# Patient Record
Sex: FEMALE | Race: BLACK OR AFRICAN AMERICAN | NOT HISPANIC OR LATINO | Employment: OTHER | URBAN - METROPOLITAN AREA
[De-identification: names, ages, dates, MRNs, and addresses within clinical notes are randomized per-mention and may not be internally consistent; named-entity substitution may affect disease eponyms.]

---

## 2017-01-11 ENCOUNTER — TELEPHONE (OUTPATIENT)
Dept: HEPATOLOGY | Facility: CLINIC | Age: 72
End: 2017-01-11

## 2017-01-11 DIAGNOSIS — R18.8 CIRRHOSIS OF LIVER WITH ASCITES, UNSPECIFIED HEPATIC CIRRHOSIS TYPE: Primary | ICD-10-CM

## 2017-01-11 DIAGNOSIS — K74.60 CIRRHOSIS OF LIVER WITH ASCITES, UNSPECIFIED HEPATIC CIRRHOSIS TYPE: Primary | ICD-10-CM

## 2017-01-11 DIAGNOSIS — B18.2 HEP C W/O COMA, CHRONIC: ICD-10-CM

## 2017-01-12 NOTE — TELEPHONE ENCOUNTER
I saw Ms. Clarisa Shi at [facility] with Dr. Elizabeth Lopez on 1/11/17, where she was referred for treatment of recently diagnosed hep C.  She has genotype 1a, viral load 551k, Patient clearly demonstrated abdominal ascites and leg edema, which came on over approx 6 weeks, Dr. Pineda started lasix 40 mg BID, spironolactone 100 mg BID, but patient states she has not responded.  She has marked muscle wasting in temporal muscles, arms, edema.in both legs.  Given her discomfort due to vary large abdomen, shortness of breath on exertion, I will see her next week at [facility] on Tuesday 1/17/17.  She will need basic labs, ultrasound with doppler, paracentesis, fluid analysis.  Later, she may need transplant.      Labs from [facility] dated 12/2/16:  Plt 142, INR 1.5, albumin 2.4, AFP 5.8    Orders placed for:  IR paracentesis, fluid protein, albumin, cell count, diff, bacterial culture.     Please schedule paracentesis for morning of Tuesday 1/17/17.    Please schedule lab appt for CBC, CMP, PT INR, AFP on 1/17/17 early AM so results will be available before IR paracentesis.    Please give appntment at 1:30 PM for me to see her in hepatology clinic.

## 2017-01-12 NOTE — TELEPHONE ENCOUNTER
MA called [facility] to get a good phone number for patient. Phone number updated.     MA called patient to inform her that she is scheduled to do Labs and Ultrasound tomorrow 1/13/17 12:00 labs and 1:15 Ultrasound. Remind patient that she need to be fasting 8 hour for the ultrasound patient understood.     Inform her also that she is scheduled to do PARACENTESIS on 1/17/17 at 10:00 am and 11:00 am to see Dr. Chun. Patient accepted the appt, mailed appt reminder to patient.Sophia

## 2017-01-16 DIAGNOSIS — R18.8 OTHER ASCITES: Primary | ICD-10-CM

## 2017-01-17 ENCOUNTER — OFFICE VISIT (OUTPATIENT)
Dept: HEPATOLOGY | Facility: CLINIC | Age: 72
End: 2017-01-17
Payer: MEDICARE

## 2017-01-17 ENCOUNTER — HOSPITAL ENCOUNTER (OUTPATIENT)
Dept: INTERVENTIONAL RADIOLOGY/VASCULAR | Facility: HOSPITAL | Age: 72
Discharge: HOME | End: 2017-01-17
Attending: INTERNAL MEDICINE
Payer: MEDICARE

## 2017-01-17 VITALS
RESPIRATION RATE: 20 BRPM | SYSTOLIC BLOOD PRESSURE: 129 MMHG | TEMPERATURE: 96 F | BODY MASS INDEX: 26.76 KG/M2 | HEART RATE: 89 BPM | DIASTOLIC BLOOD PRESSURE: 56 MMHG | WEIGHT: 156.75 LBS | OXYGEN SATURATION: 98 % | HEIGHT: 64 IN

## 2017-01-17 VITALS
RESPIRATION RATE: 20 BRPM | HEART RATE: 93 BPM | OXYGEN SATURATION: 100 % | DIASTOLIC BLOOD PRESSURE: 67 MMHG | SYSTOLIC BLOOD PRESSURE: 144 MMHG

## 2017-01-17 DIAGNOSIS — B18.2 CHRONIC HEPATITIS C WITHOUT MENTION OF HEPATIC COMA: ICD-10-CM

## 2017-01-17 DIAGNOSIS — R18.8 CIRRHOSIS OF LIVER WITH ASCITES, UNSPECIFIED HEPATIC CIRRHOSIS TYPE: Primary | ICD-10-CM

## 2017-01-17 DIAGNOSIS — R18.8 CIRRHOSIS OF LIVER WITH ASCITES, UNSPECIFIED HEPATIC CIRRHOSIS TYPE: ICD-10-CM

## 2017-01-17 DIAGNOSIS — K74.60 CIRRHOSIS OF LIVER WITH ASCITES, UNSPECIFIED HEPATIC CIRRHOSIS TYPE: ICD-10-CM

## 2017-01-17 DIAGNOSIS — K74.60 CIRRHOSIS OF LIVER WITH ASCITES, UNSPECIFIED HEPATIC CIRRHOSIS TYPE: Primary | ICD-10-CM

## 2017-01-17 LAB
ALBUMIN FLD-MCNC: 1 G/DL
APPEARANCE FLD: CLEAR
BODY FLD TYPE: NORMAL
COLOR FLD: YELLOW
LYMPHOCYTES NFR FLD MANUAL: 35 %
MESOTHL CELL NFR FLD MANUAL: 14 %
MONOS+MACROS NFR FLD MANUAL: 32 %
NEUTROPHILS NFR FLD MANUAL: 19 %
PROT FLD-MCNC: 2.6 G/DL
SPECIMEN SOURCE: NORMAL
SPECIMEN SOURCE: NORMAL
WBC # FLD: 38 /CU MM

## 2017-01-17 PROCEDURE — 87070 CULTURE OTHR SPECIMN AEROBIC: CPT

## 2017-01-17 PROCEDURE — C1729 CATH, DRAINAGE: HCPCS

## 2017-01-17 PROCEDURE — 82042 OTHER SOURCE ALBUMIN QUAN EA: CPT

## 2017-01-17 PROCEDURE — 49083 ABD PARACENTESIS W/IMAGING: CPT | Mod: GC,,, | Performed by: FAMILY MEDICINE

## 2017-01-17 PROCEDURE — 99205 OFFICE O/P NEW HI 60 MIN: CPT | Mod: S$PBB,,, | Performed by: INTERNAL MEDICINE

## 2017-01-17 PROCEDURE — 89051 BODY FLUID CELL COUNT: CPT

## 2017-01-17 PROCEDURE — 84157 ASSAY OF PROTEIN OTHER: CPT

## 2017-01-17 PROCEDURE — 99999 PR PBB SHADOW E&M-EST. PATIENT-LVL III: CPT | Mod: PBBFAC,,, | Performed by: INTERNAL MEDICINE

## 2017-01-17 PROCEDURE — 99213 OFFICE O/P EST LOW 20 MIN: CPT | Mod: PBBFAC | Performed by: INTERNAL MEDICINE

## 2017-01-17 PROCEDURE — 27100111 IR PARACENTESIS WITH IMAGING

## 2017-01-17 RX ORDER — ALBUMIN HUMAN 250 G/1000ML
25 SOLUTION INTRAVENOUS
Status: DISCONTINUED | OUTPATIENT
Start: 2017-01-17 | End: 2017-01-18 | Stop reason: HOSPADM

## 2017-01-17 RX ADMIN — ALBUMIN (HUMAN) 25 G: 25 SOLUTION INTRAVENOUS at 10:01

## 2017-01-17 NOTE — PROGRESS NOTES
Paracentesis complete. 5000 mLs peritoneal fluid drained. Pt tolerated well. Dressing to rlq clean, dry, and intact. Albumin 25% given 100 mLs. Specimens sent per lab order.  Pt given discharge instructions, verbalized understanding.  Family at bs.  Family ambulated to lobby with family at side, gait steady.

## 2017-01-17 NOTE — PATIENT INSTRUCTIONS
Recommendations:  -  Low salt in the diet, avoid canned, bottled and processed foods.  -  Continue current meds  -  CBC, CMP, PT INR  -  Ultrasound of abdomen and AFP every 6 months, starting today, next due in July 2017.  -  Avoid alcohol, smoking, sedatives and meds with codeine.  -  Avoid high intake of Tylenol (more than 4 extra-strength pills in one day)  -  Call us if any bleeding, fevers, confusion, disorientation occur  -  Endoscopy: per Gi service here - give appt at next visit. .   -  Transplant option discussed, will evaluate when MELD >15.  -  Return in 1 month.

## 2017-01-17 NOTE — MR AVS SNAPSHOT
[facility] - Hepatology  [address]  [facility] [address]  Phone: [phone]  Fax: [phone]                  Clarisa Shi   2017 11:00 AM   Office Visit    Description:  Female : 1945   Provider:  Rubia Chun MD   Department:  [facility] - Hepatology           Reason for Visit     Cirrhosis           Diagnoses this Visit        Comments    Cirrhosis of liver with ascites, unspecified hepatic cirrhosis type    -  Primary     Chronic hepatitis C without mention of hepatic coma                To Do List           Goals (5 Years of Data)     None      Follow-Up and Disposition     Return in about 1 month (around 2017).    Follow-up and Disposition History      [facility]     [facility] On Call Nurse Care Line -  Assistance  Registered nurses in the [facility] On Call Center provide clinical advisement, health education, appointment booking, and other advisory services.  Call for this free service at [phone].             Medications           Message regarding Medications     Verify the changes and/or additions to your medication regime listed below are the same as discussed with your clinician today.  If any of these changes or additions are incorrect, please notify your healthcare provider.             Verify that the below list of medications is an accurate representation of the medications you are currently taking.  If none reported, the list may be blank. If incorrect, please contact your healthcare provider. Carry this list with you in case of emergency.           Current Medications     ibuprofen (ADVIL,MOTRIN) 200 MG tablet Take 200 mg by mouth every 6 (six) hours as needed.    multivitamin (THERAGRAN) per tablet Take 1 tablet by mouth once daily.    tramadol (ULTRAM) 50 mg tablet Take 1 tablet (50 mg total) by mouth every 6 (six) hours as needed for Pain.           Clinical Reference Information           Vital Signs - Last Recorded  Most recent update:  "1/17/2017  1:45 PM by Cammie Marcial MA    BP Pulse Temp Resp Ht Wt    (!) 129/56 (BP Location: Left arm, Patient Position: Sitting, BP Method: Automatic) 89 96.3 °F (35.7 °C) (Oral) 20 5' 4" (1.626 m) 71.1 kg (156 lb 12 oz)    SpO2 BMI             98% 26.91 kg/m2         Blood Pressure          Most Recent Value    BP  (!)  129/56      Allergies as of 1/17/2017     No Known Allergies      Immunizations Administered on Date of Encounter - 1/17/2017     None      [facility] Sign-Up     Activating your [facility] account is as easy as 1-2-3!     1) Visit [email or web address], select Sign Up Now, enter this activation code and your date of birth, then select Next.  [email or web address]  Expires: 3/3/2017 11:25 AM      2) Create a username and password to use when you visit [facility] in the future and select a security question in case you lose your password and select Next.    3) Enter your e-mail address and click Sign Up!    Additional Information  If you have questions, please e-mail [email or web address] or call [phone] to talk to our [facility] staff. Remember, [facility] is NOT to be used for urgent needs. For medical emergencies, dial 911.         Instructions    Recommendations:  -  Low salt in the diet, avoid canned, bottled and processed foods.  -  Continue current meds  -  CBC, CMP, PT INR  -  Ultrasound of abdomen and AFP every 6 months, starting today, next due in July 2017.  -  Avoid alcohol, smoking, sedatives and meds with codeine.  -  Avoid high intake of Tylenol (more than 4 extra-strength pills in one day)  -  Call us if any bleeding, fevers, confusion, disorientation occur  -  Endoscopy: per Gi service here - give appt at next visit. .   -  Transplant option discussed, will evaluate when MELD >15.  -  Return in 1 month.        "

## 2017-01-17 NOTE — IP AVS SNAPSHOT
[address]  [place] [address]  Phone: [phone]           I have received a copy of my After Visit Summary and discharge instructions from [facility].    INSTRUCTIONS RECEIVED AND UNDERSTOOD BY:                     Patient/Patient Representative: ________________________________________________________________     Date/Time: ________________________________________________________________                     Instructions Given By: ________________________________________________________________     Date/Time: ________________________________________________________________

## 2017-01-17 NOTE — H&P
Radiology History & Physical      SUBJECTIVE:     Chief Complaint: ascites    History of Present Illness:  Clarisa Shi is a 71 y.o. female who presents for ultrasound guided paracentesis  No past medical history on file.  No past surgical history on file.    Home Meds:   Prior to Admission medications    Medication Sig Start Date End Date Taking? Authorizing Provider   ibuprofen (ADVIL,MOTRIN) 200 MG tablet Take 200 mg by mouth every 6 (six) hours as needed.    Historical Provider, MD   multivitamin (THERAGRAN) per tablet Take 1 tablet by mouth once daily.    Historical Provider, MD   tramadol (ULTRAM) 50 mg tablet Take 1 tablet (50 mg total) by mouth every 6 (six) hours as needed for Pain. 3/30/13   Damion Cruz MD     Anticoagulants/Antiplatelets: no anticoagulation    Allergies: Review of patient's allergies indicates:  No Known Allergies  Sedation History:  no adverse reactions    Review of Systems:   Hematological: no known coagulopathies  Respiratory: no shortness of breath  Cardiovascular: no chest pain  Gastrointestinal: no abdominal pain  Genito-Urinary: no dysuria  Musculoskeletal: negative  Neurological: no TIA or stroke symptoms         OBJECTIVE:     Vital Signs (Most Recent)  Pulse: 93 (01/17/17 1051)  Resp: 20 (01/17/17 1051)  BP: (!) 144/67 (01/17/17 1051)  SpO2: 100 % (01/17/17 1051)    Physical Exam:  ASA: 3  Mallampati: n/a    General: no acute distress  Mental Status: alert and oriented to person, place and time  HEENT: normocephalic, atraumatic  Chest: unlabored breathing  Heart: regular heart rate  Abdomen: distended  Extremity: moves all extremities    Laboratory  Lab Results   Component Value Date    INR 1.3 (H) 01/17/2017       Lab Results   Component Value Date    WBC 3.28 (L) 01/17/2017    HGB 10.7 (L) 01/17/2017    HCT 30.9 (L) 01/17/2017    MCV 95 01/17/2017     (L) 01/17/2017    No results found for: GLU, NA, K, CL, CO2, BUN, CREATININE, CALCIUM, MG, ALT, AST, ALBUMIN,  BILITOT, BILIDIR    ASSESSMENT/PLAN:     Sedation Plan: local  Patient will undergo ultrasound guided paracentesis.    TARA Gramajo, FNP  Interventional Radiology  [phone] spectralink

## 2017-01-17 NOTE — PROGRESS NOTES
[facility] Consultation Note    Reason for Consult:  The primary encounter diagnosis was Cirrhosis of liver with ascites, unspecified hepatic cirrhosis type. A diagnosis of Chronic hepatitis C without mention of hepatic coma was also pertinent to this visit.    PCP: Primary Doctor No   [address] [place] [address] [id number]    HPI:  This is a 71 y.o. female here for evaluation of: hep C, cirrhosis, ascites.    72 y/o AA female, with hep C and cirrhosis diagnosed within the last month when she presented to her PCP at [facility] [place].  She had a large ascites last week when I saw her in the Hepatitis C clinic. She was given lasix and spironolactone last week and told to come to [facility] for management of end-stage liver disease, as she may need liver transplant.        Her Hep C genotype is 1a, viral load 551,000 IU/mL, she is treatment naive, platelet count was 142, albumin 2.4, INR 1.5, AFP 5.8.       Patient had paracentesis today with drainage of about 4 bottles, (report not yet available).  U/S was ordered, did not get done, because she could not get a ride.  Her grandson will bring her here, she also plans on getting to our clinic, but he works in [employer], and he can usually bring her here when he comes to work.  She can also get a city bus.    Elevated liver enzymes: No  Abnormal imaging: No  Cirrhosis: Yes  Hepatitis C: Yes  Hepatitis B: No  Fatty liver: No  Encephalopathy: No  Post-hospital discharge: No  Symptoms: abd distention    Primary hepatic manifestations:  Fatigue:No  Edema:No  Ascites:Yes  Encephalopathy:No  Abdominal pain:No  GI bleeds: No  Pruritus:No  Weight Changes:Yes  Changes in Bowel habits: No  Muscle cramps:No    Risk factors for liver disease:  No jaundice  No transfusions  No IVDU  Did not snort cocaine or similar agents  Did not live with anyone with hepatitis B or C  Sexual partner not tested  No hepatotoxic medications  No exposure to industrial  "toxins  Alcohol: none      ROS:  Constitutional: No fevers, chills, weight changes, fatigue  ENT: No allergies, nosebleeds,   CV: No chest pain  Pulm: No cough, shortness of breath  Ophtho: No vision changes  GI/Liver: see HPI - abd distention  Derm: No rash, itching  Heme: No swollen glands, bruising  MSK: No joint pains, joint swelling  : No dysuria, hematuria, decrease in urine output  Endo: No hot or cold intolerance  Neuro: No confusion, disorientation, difficulty with sleep, memory, concentration, syncope, seizure  Psych: No anxiety, depression    Medical History:  has a past medical history of Ascites; Cirrhosis; and Hepatitis.    Surgical History:  has no past surgical history on file.    Family History: family history is not on file.. Parents , no liver cancer or cirrhosis.     Social History:  reports that she quit smoking about 5 weeks ago. She has never used smokeless tobacco. She reports that she drinks alcohol. She reports that she does not use illicit drugs.    Review of patient's allergies indicates:  No Known Allergies    Current Outpatient Prescriptions   Medication Sig    ibuprofen (ADVIL,MOTRIN) 200 MG tablet Take 200 mg by mouth every 6 (six) hours as needed.    multivitamin (THERAGRAN) per tablet Take 1 tablet by mouth once daily.    tramadol (ULTRAM) 50 mg tablet Take 1 tablet (50 mg total) by mouth every 6 (six) hours as needed for Pain.     No current facility-administered medications for this visit.      Facility-Administered Medications Ordered in Other Visits   Medication    albumin human 25% bottle 25 g       Objective Findings:    Vital Signs:  Visit Vitals    BP (!) 129/56 (BP Location: Left arm, Patient Position: Sitting, BP Method: Automatic)    Pulse 89    Temp 96.3 °F (35.7 °C) (Oral)    Resp 20    Ht 5' 4" (1.626 m)    Wt 71.1 kg (156 lb 12 oz)    SpO2 98%    BMI 26.91 kg/m2     Body mass index is 26.91 kg/(m^2).    Physical Exam:  General Appearance: Well " appearing in no acute distress  Head:   Normocephalic, without obvious abnormality  Eyes:    No scleral icterus, EOMI  ENT: Neck supple, Lips, mucosa, and tongue normal; teeth and gums normal  Lungs: CTA bilaterally in anterior and posterior fields, no wheezes, no crackles.  Heart:  Regular rate and rhythm, S1, S2 normal, no murmurs heard  Abdomen: Soft, non tender, non distended with positive bowel sounds in all four quadrants. No hepatosplenomegaly, ascites, or mass  Extremities: 2+ pulses, no clubbing, cyanosis or edema  Skin: No rash  Neurologic: CN II-XII intact, alert, oriented x 3. No asterixis      Labs:  Lab Results   Component Value Date    WBC 3.28 (L) 01/17/2017    HGB 10.7 (L) 01/17/2017    HCT 30.9 (L) 01/17/2017     (L) 01/17/2017    INR 1.3 (H) 01/17/2017     CMP  No results found for: NA, K, CL, CO2, GLU, BUN, CREATININE, CALCIUM, PROT, ALBUMIN, BILITOT, ALKPHOS, AST, ALT, ANIONGAP, ESTGFRAFRICA, EGFRNONAA  Imaging:       Endoscopy:      Assessment:  1. Cirrhosis of liver with ascites, unspecified hepatic cirrhosis type    2. Chronic hepatitis C without mention of hepatic coma    -  hep C, decompensated cirrhosis, with ascites. On lasix and spironolactone.   -  HCV genotype 1a, treatment naive, will need Harvoni. X 12 weeks  -  Ascites: had paracentesis today, so far wbc count in fluid is low 38.   -  MELD labs pending -   -  Will start transplant eval if MELD >15.     Recommendations:  -  Low salt in the diet, avoid canned, bottled and processed foods.  -  Continue current meds  -  CBC, CMP, PT INR  -  Ultrasound of abdomen and AFP every 6 months, starting today, next due in July 2017.  -  Avoid alcohol, smoking, sedatives and meds with codeine.  -  Avoid high intake of Tylenol (more than 4 extra-strength pills in one day)  -  Call us if any bleeding, fevers, confusion, disorientation occur  -  Endoscopy: per Gi service here - give appt at next visit. .   -  Transplant option discussed, will  evaluate when MELD >15.  -  Return in 1 month.       No Follow-up on file.      Order summary:  No orders of the defined types were placed in this encounter.      Thank you so much for allowing me to participate in the care of Clarisa Chun MD

## 2017-01-17 NOTE — IP AVS SNAPSHOT
[facility]  [address]  [facility] [address]  Phone: [phone]           Patient Discharge Instructions     Our goal is to set you up for success. This packet includes information on your condition, medications, and your home care. It will help you to care for yourself so you don't get sicker and need to go back to the hospital.     Please ask your nurse if you have any questions.        There are many details to remember when preparing to leave the hospital. Here is what you will need to do:    1. Take your medicine. If you are prescribed medications, review your Medication List in the following pages. You may have new medications to  at the pharmacy and others that you'll need to stop taking. Review the instructions for how and when to take your medications. Talk with your doctor or nurses if you are unsure of what to do.     2. Go to your follow-up appointments. Specific follow-up information is listed in the following pages. Your may be contacted by a transition nurse or clinical provider about future appointments. Be sure we have all of the phone numbers to reach you, if needed. Please contact your provider's office if you are unable to make an appointment.     3. Watch for warning signs. Your doctor or nurse will give you detailed warning signs to watch for and when to call for assistance. These instructions may also include educational information about your condition. If you experience any of warning signs to your health, call your doctor.               [facility]  Unless otherwise directed by your provider, please contact [facility], our nurse care line that is available for 24/7 assistance.     [phone] (toll-free)    Registered nurses in the [facility] On [facility] Center provide clinical advisement, health education, appointment booking, and other advisory services.                    ** Verify the list of medication(s) below is accurate and up  to date. Carry this with you in case of emergency. If your medications have changed, please notify your healthcare provider.             Medication List      TAKE these medications        Additional Info                      ibuprofen 200 MG tablet   Commonly known as:  ADVIL,MOTRIN   Refills:  0   Dose:  200 mg    Instructions:  Take 200 mg by mouth every 6 (six) hours as needed.     Begin Date    AM    Noon    PM    Bedtime       multivitamin per tablet   Commonly known as:  THERAGRAN   Refills:  0   Dose:  1 tablet    Instructions:  Take 1 tablet by mouth once daily.     Begin Date    AM    Noon    PM    Bedtime       tramadol 50 mg tablet   Commonly known as:  ULTRAM   Quantity:  10 tablet   Refills:  0   Dose:  50 mg    Instructions:  Take 1 tablet (50 mg total) by mouth every 6 (six) hours as needed for Pain.     Begin Date    AM    Noon    PM    Bedtime                  Please bring to all follow up appointments:    1. A copy of your discharge instructions.  2. All medicines you are currently taking in their original bottles.  3. Identification and insurance card.    Please arrive 15 minutes ahead of scheduled appointment time.    Please call 24 hours in advance if you must reschedule your appointment and/or time.            Discharge Instructions       For scheduling: Call Gayatri at [phone]    For questions or concerns call: [facility] MON-FRI 8 AM- 5PM [phone]. Radiology resident on call [phone].    For immediate concerns that are not emergent, you may call our radiology clinic at: [phone]    Discharge References/Attachments     PARACENTESIS, DISCHARGE INSTRUCTIONS FOR (ENGLISH)        Admission Information     Date & Time Provider Department CSN    1/17/2017 10:00 AM Rubia Chun MD [facility] [id number]      Care Providers     Provider Role Specialty Primary office phone    Rubia Chun MD Attending Provider Gastroenterology [phone]      Your Vitals Were     BP  Pulse Resp SpO2          144/67 (BP Location: Left arm, Patient Position: Sitting, BP Method: Automatic) 93 20 100%        Recent Lab Values     No lab values to display.      Pending Labs     Order Current Status    Albumin, Peritoneal, Pleural Fluid or LISA Drainage, In-House Ascites In process    Culture, Body Fluid - Bactec In process    Protein, Peritoneal, Pleural Fluid or LISA Drainage, In-House Ascites In process    WBC & Diff,Body Fluid Ascites In process      Allergies as of 1/17/2017     No Known Allergies      Advance Directives     An advance directive is a document which, in the event you are no longer able to make decisions for yourself, tells your healthcare team what kind of treatment you do or do not want to receive, or who you would like to make those decisions for you.  If you do not currently have an advance directive, [facility] encourages you to create one.  For more information call:  [phone]), [phone] ([phone]),  or log on to [email or web address].        Smoking Cessation     If you would like to quit smoking:   You may be eligible for free services if you are a [place] resident and started smoking cigarettes before September 1, 1988.  Call the Smoking Cessation Trust ([facility]) toll free at (341) 024-8923 or (915) 616-2293.   Call 1-842-QUIT-NOW if you do not meet the above criteria.            Language Assistance Services     ATTENTION: Language assistance services are available, free of charge. Please call [phone].      ATENCIÓN: Si habla español, tiene a ahumada disposición servicios gratuitos de asistencia lingüística. Llame al [phone].     [facility] Ý: N?u b?n nói Ti?ng Vi?t, có các d?ch v? h? tr? ngôn ng? mi?n phí dành cho b?n. G?i s? [phone].        [facility] Sign-Up     Activating your [facility] account is as easy as 1-2-3!     1) Visit [email or web address], select Sign Up Now, enter this activation code and your date of birth, then select  Next.  [email or web address]  Expires: 3/3/2017 11:25 AM      2) Create a username and password to use when you visit [facility] in the future and select a security question in case you lose your password and select Next.    3) Enter your e-mail address and click Sign Up!    Additional Information  If you have questions, please e-mail [employer] [facility] [employer] [email or web address] or call [phone] to talk to our [facility] staff. Remember, [facility] is NOT to be used for urgent needs. For medical emergencies, dial 911.          [facility] complies with applicable Federal civil rights laws and does not discriminate on the basis of race, color, national origin, age, disability, or sex.

## 2017-01-17 NOTE — PROCEDURES
Radiology Post-Procedure Note    Pre Op Diagnosis: Ascites  Post Op Diagnosis: Same    Procedure: Ultrasound Guided Paracentesis    Procedure performed by: Dutch HERRERA, Ruth     Written Informed Consent Obtained: Yes  Specimen Removed: YES clear yellow fluid  Estimated Blood Loss: Minimal    Findings:   Successful paracentesis.  Albumin administered PRN per protocol.    Patient tolerated procedure well.    Ruth Judd, APRN, FNP  Interventional Radiology  [phone] spectralink

## 2017-01-22 LAB — BACTERIA FLD CULT: NORMAL

## 2017-02-10 ENCOUNTER — TELEPHONE (OUTPATIENT)
Dept: HEPATOLOGY | Facility: CLINIC | Age: 72
End: 2017-02-10

## 2017-02-10 NOTE — TELEPHONE ENCOUNTER
Received call from Dr Chun to set the patient up for a Paracentesis on Mon or Tues 2/13 or 2/14/17, then every 2 weeks thereafter.    Call placed to IR and spoke to Gayatri. Monday 2/13/17 at 8 am is available and then the patient can come back on Mon 2/27/17 at 8 am.  Patient called at home and message relayed to the patient from Dr Chun.   Patient offered the appointments on 2/13 and 2 week later 2/27/17 both at 8 am. Both were acceptable for the patient. Voiced understanding and agreed. DB

## 2017-02-13 ENCOUNTER — HOSPITAL ENCOUNTER (OUTPATIENT)
Dept: INTERVENTIONAL RADIOLOGY/VASCULAR | Facility: HOSPITAL | Age: 72
Discharge: HOME | End: 2017-02-13
Attending: INTERNAL MEDICINE
Payer: MEDICARE

## 2017-02-13 VITALS
OXYGEN SATURATION: 100 % | DIASTOLIC BLOOD PRESSURE: 50 MMHG | SYSTOLIC BLOOD PRESSURE: 114 MMHG | RESPIRATION RATE: 18 BRPM | HEART RATE: 99 BPM

## 2017-02-13 DIAGNOSIS — R18.8 CIRRHOSIS OF LIVER WITH ASCITES, UNSPECIFIED HEPATIC CIRRHOSIS TYPE: ICD-10-CM

## 2017-02-13 DIAGNOSIS — K74.60 CIRRHOSIS OF LIVER WITH ASCITES, UNSPECIFIED HEPATIC CIRRHOSIS TYPE: ICD-10-CM

## 2017-02-13 LAB
ALBUMIN FLD-MCNC: 0.9 G/DL
APPEARANCE FLD: CLEAR
BODY FLD TYPE: NORMAL
COLOR FLD: YELLOW
LYMPHOCYTES NFR FLD MANUAL: 53 %
MONOS+MACROS NFR FLD MANUAL: 40 %
NEUTROPHILS NFR FLD MANUAL: 7 %
PROT FLD-MCNC: 2.3 G/DL
SPECIMEN SOURCE: NORMAL
SPECIMEN SOURCE: NORMAL
WBC # FLD: 41 /CU MM

## 2017-02-13 PROCEDURE — 84157 ASSAY OF PROTEIN OTHER: CPT

## 2017-02-13 PROCEDURE — 49083 ABD PARACENTESIS W/IMAGING: CPT | Mod: ,,, | Performed by: FAMILY MEDICINE

## 2017-02-13 PROCEDURE — 63600175 PHARM REV CODE 636 W HCPCS: Performed by: INTERNAL MEDICINE

## 2017-02-13 PROCEDURE — 87070 CULTURE OTHR SPECIMN AEROBIC: CPT

## 2017-02-13 PROCEDURE — P9047 ALBUMIN (HUMAN), 25%, 50ML: HCPCS | Performed by: INTERNAL MEDICINE

## 2017-02-13 PROCEDURE — 89051 BODY FLUID CELL COUNT: CPT

## 2017-02-13 PROCEDURE — C1729 CATH, DRAINAGE: HCPCS

## 2017-02-13 PROCEDURE — 82042 OTHER SOURCE ALBUMIN QUAN EA: CPT

## 2017-02-13 RX ORDER — ALBUMIN HUMAN 250 G/1000ML
25 SOLUTION INTRAVENOUS CONTINUOUS PRN
Status: DISCONTINUED | OUTPATIENT
Start: 2017-02-13 | End: 2017-02-14 | Stop reason: HOSPADM

## 2017-02-13 RX ADMIN — ALBUMIN (HUMAN) 25 G: 25 SOLUTION INTRAVENOUS at 09:02

## 2017-02-13 NOTE — PROGRESS NOTES
Patient given discharge instructions and verbalized understanding. [facility]

## 2017-02-13 NOTE — DISCHARGE INSTRUCTIONS
"For scheduling: Call Gayatri at [phone]    For questions or concerns call: [facility] MON-FRI 8 AM- 5PM: [phone].   **After hours and weekends: Call [phone] and ask for "Radiology Resident on call".    For immediate concerns that are not emergent, you may call our radiology clinic at: [phone]    "

## 2017-02-13 NOTE — H&P
Radiology History & Physical      SUBJECTIVE:     Chief Complaint: ascites    History of Present Illness:  Clarisa Shi is a 71 y.o. female who presents for ultrasound guided paracentesis  Past Medical History   Diagnosis Date    Ascites     Cirrhosis     Hepatitis      Hep C deja 1a, treatment naive     No past surgical history on file.    Home Meds:   Prior to Admission medications    Medication Sig Start Date End Date Taking? Authorizing Provider   ibuprofen (ADVIL,MOTRIN) 200 MG tablet Take 200 mg by mouth every 6 (six) hours as needed.    Historical Provider, MD   multivitamin (THERAGRAN) per tablet Take 1 tablet by mouth once daily.    Historical Provider, MD   tramadol (ULTRAM) 50 mg tablet Take 1 tablet (50 mg total) by mouth every 6 (six) hours as needed for Pain. 3/30/13   Damion Cruz MD     Anticoagulants/Antiplatelets: no anticoagulation    Allergies: Review of patient's allergies indicates:  No Known Allergies  Sedation History:  no adverse reactions    Review of Systems:   Hematological: no known coagulopathies  Respiratory: no shortness of breath  Cardiovascular: no chest pain  Gastrointestinal: no abdominal pain  Genito-Urinary: no dysuria  Musculoskeletal: negative  Neurological: no TIA or stroke symptoms         OBJECTIVE:     Vital Signs (Most Recent)  Pulse: 91 (02/13/17 0820)  Resp: 18 (02/13/17 0820)  BP: 124/74 (02/13/17 0820)  SpO2: 100 % (02/13/17 0820)    Physical Exam:  ASA: 3  Mallampati: n/a    General: no acute distress  Mental Status: alert and oriented to person, place and time  HEENT: normocephalic, atraumatic  Chest: unlabored breathing  Heart: regular heart rate  Abdomen: distended  Extremity: moves all extremities    Laboratory  Lab Results   Component Value Date    INR 1.3 (H) 01/17/2017       Lab Results   Component Value Date    WBC 3.28 (L) 01/17/2017    HGB 10.7 (L) 01/17/2017    HCT 30.9 (L) 01/17/2017    MCV 95 01/17/2017     (L) 01/17/2017    No  results found for: GLU, NA, K, CL, CO2, BUN, CREATININE, CALCIUM, MG, ALT, AST, ALBUMIN, BILITOT, BILIDIR    ASSESSMENT/PLAN:     Sedation Plan: local  Patient will undergo ultrasound guided paracentesis.    TARA Gramajo, FNP  Interventional Radiology  [phone] spectralink

## 2017-02-13 NOTE — PROCEDURES
Radiology Post-Procedure Note    Pre Op Diagnosis: Ascites  Post Op Diagnosis: Same    Procedure: Ultrasound Guided Paracentesis    Procedure performed by: Dutch HERRERA, Ruth     Written Informed Consent Obtained: Yes  Specimen Removed: YES clear yellow fluid  Estimated Blood Loss: Minimal    Findings:   Successful paracentesis.  Albumin administered PRN per protocol.    Patient tolerated procedure well.    Ruth Judd, APRN, FNP  Interventional Radiology  [phone] spectralink

## 2017-02-13 NOTE — IP AVS SNAPSHOT
[facility]  [address]  [facility] [address]  Phone: [phone]           Patient Discharge Instructions     Our goal is to set you up for success. This packet includes information on your condition, medications, and your home care. It will help you to care for yourself so you don't get sicker and need to go back to the hospital.     Please ask your nurse if you have any questions.        There are many details to remember when preparing to leave the hospital. Here is what you will need to do:    1. Take your medicine. If you are prescribed medications, review your Medication List in the following pages. You may have new medications to  at the pharmacy and others that you'll need to stop taking. Review the instructions for how and when to take your medications. Talk with your doctor or nurses if you are unsure of what to do.     2. Go to your follow-up appointments. Specific follow-up information is listed in the following pages. Your may be contacted by a transition nurse or clinical provider about future appointments. Be sure we have all of the phone numbers to reach you, if needed. Please contact your provider's office if you are unable to make an appointment.     3. Watch for warning signs. Your doctor or nurse will give you detailed warning signs to watch for and when to call for assistance. These instructions may also include educational information about your condition. If you experience any of warning signs to your health, call your doctor.               [facility]  Unless otherwise directed by your provider, please contact [facility], our nurse care line that is available for 24/7 assistance.     [phone] (toll-free)    Registered nurses in the [facility] provide clinical advisement, health education, appointment booking, and other advisory services.                    ** Verify the list of medication(s) below is accurate and up  "to date. Carry this with you in case of emergency. If your medications have changed, please notify your healthcare provider.             Medication List      TAKE these medications        Additional Info                      ibuprofen 200 MG tablet   Commonly known as:  ADVIL,MOTRIN   Refills:  0   Dose:  200 mg    Instructions:  Take 200 mg by mouth every 6 (six) hours as needed.     Begin Date    AM    Noon    PM    Bedtime       multivitamin per tablet   Commonly known as:  THERAGRAN   Refills:  0   Dose:  1 tablet    Instructions:  Take 1 tablet by mouth once daily.     Begin Date    AM    Noon    PM    Bedtime       tramadol 50 mg tablet   Commonly known as:  ULTRAM   Quantity:  10 tablet   Refills:  0   Dose:  50 mg    Instructions:  Take 1 tablet (50 mg total) by mouth every 6 (six) hours as needed for Pain.     Begin Date    AM    Noon    PM    Bedtime                  Please bring to all follow up appointments:    1. A copy of your discharge instructions.  2. All medicines you are currently taking in their original bottles.  3. Identification and insurance card.    Please arrive 15 minutes ahead of scheduled appointment time.    Please call 24 hours in advance if you must reschedule your appointment and/or time.        Your Scheduled Appointments     Feb 20, 2017 10:40 AM CST   Established Patient Visit with Jyotsna Hayes NP   [facility] - Hepatology ([facility] )    [address]  [place] [id number] [phone]            Feb 27, 2017  8:00 AM CST   IR PARACENTHESIS with [facility] [id number]   [facility] ([facility])    [address] [id number] [phone]                  Discharge Instructions       For scheduling: Call Gayatri at [phone]    For questions or concerns call: ROCU MON-FRI 8 AM- 5PM: [phone].   **After hours and weekends: Call [phone] and ask for "Radiology Resident on call".    For immediate concerns that " are not emergent, you may call our radiology clinic at: [phone]      Discharge References/Attachments     PARACENTESIS, DISCHARGE INSTRUCTIONS FOR (ENGLISH)        Admission Information     Date & Time Provider Department CSN    2/13/2017  8:00 AM Rubia Chun MD [facility] [id number]      Care Providers     Provider Role Specialty Primary office phone    Rubia Chun MD Attending Provider Gastroenterology [phone]      Your Vitals Were     BP Pulse Resp SpO2          124/74 (Patient Position: Sitting, BP Method: Automatic) 91 18 100%        Recent Lab Values     No lab values to display.      Pending Labs     Order Current Status    Albumin, Peritoneal, Pleural Fluid or LISA Drainage, In-House Ascites In process    Culture, Body Fluid - Bactec In process    Protein, Peritoneal, Pleural Fluid or LISA Drainage, In-House Ascites In process    WBC & Diff,Body Fluid Ascites In process      Allergies as of 2/13/2017     No Known Allergies      Advance Directives     An advance directive is a document which, in the event you are no longer able to make decisions for yourself, tells your healthcare team what kind of treatment you do or do not want to receive, or who you would like to make those decisions for you.  If you do not currently have an advance directive, [facility] encourages you to create one.  For more information call:  [phone]), [phone] ([phone]),  or log on to [email or web address].        Smoking Cessation     If you would like to quit smoking:   You may be eligible for free services if you are a [place] resident and started smoking cigarettes before September 1, 1988.  Call the Smoking Cessation Trust (SCT) toll free at (036) 732-3511 or (470) 736-4342.   Call 2-960-QUIT-NOW if you do not meet the above criteria.            Language Assistance Services     ATTENTION: Language assistance services are available, free of charge. Please call  [phone].      ATENCIÓN: Si habla español, tiene a ahumada disposición servicios gratuitos de asistencia lingüística. Llame al [phone].     CHÚ Ý: N?u b?n nói Ti?ng Vi?t, có các d?ch v? h? tr? ngôn ng? mi?n phí dành cho b?n. G?i s? [phone].        [facility] Sign-Up     Activating your [facility] account is as easy as 1-2-3!     1) Visit [email or web address], select Sign Up Now, enter this activation code and your date of birth, then select Next.  [email or web address]  Expires: 3/3/2017 11:25 AM      2) Create a username and password to use when you visit [facility] in the future and select a security question in case you lose your password and select Next.    3) Enter your e-mail address and click Sign Up!    Additional Information  If you have questions, please e-mail [email or web address] or call [phone] to talk to our [facility] staff. Remember, [facility] is NOT to be used for urgent needs. For medical emergencies, dial 911.          [facility] complies with applicable Federal civil rights laws and does not discriminate on the basis of race, color, national origin, age, disability, or sex.

## 2017-02-18 LAB — BACTERIA FLD CULT: NORMAL

## 2017-02-20 ENCOUNTER — OFFICE VISIT (OUTPATIENT)
Dept: HEPATOLOGY | Facility: CLINIC | Age: 72
End: 2017-02-20
Payer: MEDICARE

## 2017-02-20 ENCOUNTER — LAB VISIT (OUTPATIENT)
Dept: LAB | Facility: HOSPITAL | Age: 72
End: 2017-02-20
Attending: INTERNAL MEDICINE
Payer: MEDICARE

## 2017-02-20 VITALS
RESPIRATION RATE: 18 BRPM | SYSTOLIC BLOOD PRESSURE: 121 MMHG | HEART RATE: 82 BPM | WEIGHT: 157.88 LBS | OXYGEN SATURATION: 100 % | TEMPERATURE: 97 F | HEIGHT: 64 IN | DIASTOLIC BLOOD PRESSURE: 58 MMHG | BODY MASS INDEX: 26.95 KG/M2

## 2017-02-20 DIAGNOSIS — K74.60 CIRRHOSIS OF LIVER WITH ASCITES, UNSPECIFIED HEPATIC CIRRHOSIS TYPE: ICD-10-CM

## 2017-02-20 DIAGNOSIS — K74.60 CIRRHOSIS OF LIVER WITH ASCITES, UNSPECIFIED HEPATIC CIRRHOSIS TYPE: Primary | ICD-10-CM

## 2017-02-20 DIAGNOSIS — R18.8 CIRRHOSIS OF LIVER WITH ASCITES, UNSPECIFIED HEPATIC CIRRHOSIS TYPE: Primary | ICD-10-CM

## 2017-02-20 DIAGNOSIS — R18.8 CIRRHOSIS OF LIVER WITH ASCITES, UNSPECIFIED HEPATIC CIRRHOSIS TYPE: ICD-10-CM

## 2017-02-20 LAB
AFP SERPL-MCNC: 5.3 NG/ML
ALBUMIN SERPL BCP-MCNC: 2.4 G/DL
ALP SERPL-CCNC: 138 U/L
ALT SERPL W/O P-5'-P-CCNC: 20 U/L
ANION GAP SERPL CALC-SCNC: 6 MMOL/L
AST SERPL-CCNC: 45 U/L
BASOPHILS # BLD AUTO: 0.01 K/UL
BASOPHILS NFR BLD: 0.3 %
BILIRUB SERPL-MCNC: 1.7 MG/DL
BUN SERPL-MCNC: 21 MG/DL
CALCIUM SERPL-MCNC: 8.4 MG/DL
CHLORIDE SERPL-SCNC: 104 MMOL/L
CO2 SERPL-SCNC: 27 MMOL/L
CREAT SERPL-MCNC: 1.3 MG/DL
DIFFERENTIAL METHOD: ABNORMAL
EOSINOPHIL # BLD AUTO: 0.1 K/UL
EOSINOPHIL NFR BLD: 2.2 %
ERYTHROCYTE [DISTWIDTH] IN BLOOD BY AUTOMATED COUNT: 14.2 %
EST. GFR  (AFRICAN AMERICAN): 48.4 ML/MIN/1.73 M^2
EST. GFR  (NON AFRICAN AMERICAN): 42 ML/MIN/1.73 M^2
GLUCOSE SERPL-MCNC: 97 MG/DL
HCT VFR BLD AUTO: 35.6 %
HGB BLD-MCNC: 11.6 G/DL
INR PPP: 1.3
LYMPHOCYTES # BLD AUTO: 1 K/UL
LYMPHOCYTES NFR BLD: 31.3 %
MCH RBC QN AUTO: 31.4 PG
MCHC RBC AUTO-ENTMCNC: 32.6 %
MCV RBC AUTO: 96 FL
MONOCYTES # BLD AUTO: 0.4 K/UL
MONOCYTES NFR BLD: 11.3 %
NEUTROPHILS # BLD AUTO: 1.7 K/UL
NEUTROPHILS NFR BLD: 54.6 %
PLATELET # BLD AUTO: 157 K/UL
PMV BLD AUTO: 10 FL
POTASSIUM SERPL-SCNC: 3.8 MMOL/L
PROT SERPL-MCNC: 6 G/DL
PROTHROMBIN TIME: 13.6 SEC
RBC # BLD AUTO: 3.7 M/UL
SODIUM SERPL-SCNC: 137 MMOL/L
WBC # BLD AUTO: 3.19 K/UL

## 2017-02-20 PROCEDURE — 82105 ALPHA-FETOPROTEIN SERUM: CPT

## 2017-02-20 PROCEDURE — 85610 PROTHROMBIN TIME: CPT

## 2017-02-20 PROCEDURE — 36415 COLL VENOUS BLD VENIPUNCTURE: CPT

## 2017-02-20 PROCEDURE — 85025 COMPLETE CBC W/AUTO DIFF WBC: CPT

## 2017-02-20 PROCEDURE — 99999 PR PBB SHADOW E&M-EST. PATIENT-LVL III: CPT | Mod: PBBFAC,,, | Performed by: INTERNAL MEDICINE

## 2017-02-20 PROCEDURE — 80053 COMPREHEN METABOLIC PANEL: CPT

## 2017-02-20 PROCEDURE — 99215 OFFICE O/P EST HI 40 MIN: CPT | Mod: S$PBB,,, | Performed by: INTERNAL MEDICINE

## 2017-02-20 RX ORDER — SPIRONOLACTONE 100 MG/1
100 TABLET, FILM COATED ORAL 2 TIMES DAILY
COMMUNITY

## 2017-02-20 RX ORDER — FUROSEMIDE 80 MG/1
80 TABLET ORAL 2 TIMES DAILY
COMMUNITY
Start: 2017-01-29

## 2017-02-20 NOTE — PATIENT INSTRUCTIONS
Plan:  -  Low salt in the diet, avoid canned, bottled and processed foods.  -  Continue current meds  -  CBC, CMP, PT INR q 2 months, start today  -  Ultrasound of abdomen and AFP every 6 months, starting today, next due in July 2017.  -  Avoid alcohol, smoking, sedatives and meds with codeine.  -  Avoid high intake of Tylenol (more than 4 extra-strength pills in one day)  -  Call us if any bleeding, fevers, confusion, disorientation occur  -  Endoscopy: per Gi service here - give appt.   -  Transplant option discussed, will evaluate when MELD >15.  -  Return in 2 months.

## 2017-02-20 NOTE — MR AVS SNAPSHOT
[facility] - Hepatology  [address]  [facility] [address]  Phone: [phone]  Fax: [phone]                  Clarisa Shi   2017 10:40 AM   Office Visit    Description:  Female : 1945   Provider:  Rubia Chun MD   Department:  [facility]           Reason for Visit     Follow-up     Cirrhosis     Hepatitis C           Diagnoses this Visit        Comments    Cirrhosis of liver with ascites, unspecified hepatic cirrhosis type    -  Primary            To Do List           Future Appointments        Provider Department Dept Phone    2017 8:00 AM [facility] [id number] [facility] [phone]      Goals (5 Years of Data)     None      Follow-Up and Disposition     Return in about 2 months (around 2017).    Follow-up and Disposition History      [facility]     [facility] Nurse Care Line -  Assistance  Registered nurses in the [facility] provide clinical advisement, health education, appointment booking, and other advisory services.  Call for this free service at [phone].             Medications           Message regarding Medications     Verify the changes and/or additions to your medication regime listed below are the same as discussed with your clinician today.  If any of these changes or additions are incorrect, please notify your healthcare provider.             Verify that the below list of medications is an accurate representation of the medications you are currently taking.  If none reported, the list may be blank. If incorrect, please contact your healthcare provider. Carry this list with you in case of emergency.           Current Medications     furosemide (LASIX) 80 MG tablet Take 80 mg by mouth 2 (two) times daily.    multivitamin (THERAGRAN) per tablet Take 1 tablet by mouth once daily.    spironolactone (ALDACTONE) 100 MG tablet Take 100 mg by mouth 2 (two) times daily.    ibuprofen (ADVIL,MOTRIN) 200 MG  "tablet Take 200 mg by mouth every 6 (six) hours as needed.    tramadol (ULTRAM) 50 mg tablet Take 1 tablet (50 mg total) by mouth every 6 (six) hours as needed for Pain.           Clinical Reference Information           Your Vitals Were     BP Pulse Temp Resp Height Weight    121/58 (BP Location: Left arm, Patient Position: Sitting) 82 96.9 °F (36.1 °C) (Oral) 18 5' 4" (1.626 m) 71.6 kg (157 lb 13.6 oz)    SpO2 BMI             100% 27.09 kg/m2         Blood Pressure          Most Recent Value    BP  (!)  121/58      Allergies as of 2/20/2017     No Known Allergies      Immunizations Administered on Date of Encounter - 2/20/2017     None      Orders Placed During Today's Visit      Normal Orders This Visit    Case request GI: ESOPHAGOGASTRODUODENOSCOPY (EGD)     Recurring Lab Work Interval Expires    AFP tumor marker  every 6 months until 2/20/2018 2/20/2018    CBC auto differential  every 2 months until 2/20/2019 2/20/2019    Comprehensive metabolic panel  every 2 months until 2/20/2019 2/20/2019    Protime-INR  every 2 months until 2/20/2019 2/20/2019    US Abdomen Limited  every 6 months until 2/20/2019 2/20/2019      [facility] Sign-Up     Activating your [facility] account is as easy as 1-2-3!     1) Visit [email or web address], select Sign Up Now, enter this activation code and your date of birth, then select Next.  [email or web address]  Expires: 3/3/2017 11:25 AM      2) Create a username and password to use when you visit [facility] in the future and select a security question in case you lose your password and select Next.    3) Enter your e-mail address and click Sign Up!    Additional Information  If you have questions, please e-mail [email or web address] or call [phone] to talk to our [facility] staff. Remember, [facility] is NOT to be used for urgent needs. For medical emergencies, dial 911.         Instructions    Plan:  -  Low salt in the diet, avoid canned, bottled and processed foods.  -  Continue current " meds  -  CBC, CMP, PT INR q 2 months, start today  -  Ultrasound of abdomen and AFP every 6 months, starting today, next due in July 2017.  -  Avoid alcohol, smoking, sedatives and meds with codeine.  -  Avoid high intake of Tylenol (more than 4 extra-strength pills in one day)  -  Call us if any bleeding, fevers, confusion, disorientation occur  -  Endoscopy: per Gi service here - give appt.   -  Transplant option discussed, will evaluate when MELD >15.  -  Return in 2 months.          Language Assistance Services     ATTENTION: Language assistance services are available, free of charge. Please call [phone].      ATENCIÓN: Si habla español, tiene a ahumada disposición servicios gratuitos de asistencia lingüística. Llame al [phone].     SHILPA Ý: N?u b?n nói Ti?ng Vi?t, có các d?ch v? h? tr? ngôn ng? mi?n phí dành cho b?n. G?i s? [phone].         [facility] complies with applicable Federal civil rights laws and does not discriminate on the basis of race, color, national origin, age, disability, or sex.

## 2017-02-20 NOTE — PROGRESS NOTES
[facility] Consultation Note    Reason for Consult:  The encounter diagnosis was Cirrhosis of liver with ascites, unspecified hepatic cirrhosis type.    PCP: Primary Doctor No       HPI:  This is a 71 y.o. female here for evaluation of: hep C, cirrhosis, ascites.    72 y/o AA female, with hep C and cirrhosis diagnosed within the last month when she presented to her PCP at [facility] in [place].  She had a large ascites last week when I saw her in the Hepatitis C clinic. She was given lasix and spironolactone last week and told to come to [facility] for management of end-stage liver disease, as she may need liver transplant.        Her Hep C genotype is 1a, viral load 551,000 IU/mL, she is treatment naive, platelet count was 142, albumin 2.4, INR 1.5, AFP 5.8.       Patient had paracentesis today with drainage of about 4 bottles, (report not yet available).  U/S was ordered, did not get done, because she could not get a ride.  Her grandson will bring her here, she also plans on getting to our clinic, but he works in [employer], and he can usually bring her here when he comes to work.  She can also get a city bus.    Elevated liver enzymes: No  Abnormal imaging: No  Cirrhosis: Yes  Hepatitis C: Yes  Hepatitis B: No  Fatty liver: No  Encephalopathy: No  Post-hospital discharge: No  Symptoms: abd distention    Primary hepatic manifestations:  Fatigue:No  Edema:No  Ascites:Yes  Encephalopathy:No  Abdominal pain:No  GI bleeds: No  Pruritus:No  Weight Changes:Yes  Changes in Bowel habits: No  Muscle cramps:No    Risk factors for liver disease:  No jaundice  No transfusions  No IVDU  Did not snort cocaine or similar agents  Did not live with anyone with hepatitis B or C  Sexual partner not tested  No hepatotoxic medications  No exposure to industrial toxins  Alcohol: none      ROS:  Constitutional: No fevers, chills, weight changes, fatigue  ENT: No allergies, nosebleeds,   CV: No chest  "pain  Pulm: No cough, shortness of breath  Ophtho: No vision changes  GI/Liver: see HPI - abd distention  Derm: No rash, itching  Heme: No swollen glands, bruising  MSK: No joint pains, joint swelling  : No dysuria, hematuria, decrease in urine output  Endo: No hot or cold intolerance  Neuro: No confusion, disorientation, difficulty with sleep, memory, concentration, syncope, seizure  Psych: No anxiety, depression    Medical History:  has a past medical history of Ascites; Cirrhosis; and Hepatitis.    Surgical History:  has no past surgical history on file.    Family History: family history is not on file.. Parents , no liver cancer or cirrhosis.     Social History:  reports that she quit smoking about 2 months ago. She has never used smokeless tobacco. She reports that she drinks alcohol. She reports that she does not use illicit drugs.    Review of patient's allergies indicates:  No Known Allergies    Current Outpatient Prescriptions   Medication Sig    furosemide (LASIX) 80 MG tablet Take 80 mg by mouth 2 (two) times daily.    multivitamin (THERAGRAN) per tablet Take 1 tablet by mouth once daily.    spironolactone (ALDACTONE) 100 MG tablet Take 100 mg by mouth 2 (two) times daily.    ibuprofen (ADVIL,MOTRIN) 200 MG tablet Take 200 mg by mouth every 6 (six) hours as needed.    tramadol (ULTRAM) 50 mg tablet Take 1 tablet (50 mg total) by mouth every 6 (six) hours as needed for Pain.     No current facility-administered medications for this visit.        Objective Findings:    Vital Signs:  Visit Vitals    BP (!) 121/58 (BP Location: Left arm, Patient Position: Sitting)    Pulse 82    Temp 96.9 °F (36.1 °C) (Oral)    Resp 18    Ht 5' 4" (1.626 m)    Wt 71.6 kg (157 lb 13.6 oz)    SpO2 100%    BMI 27.09 kg/m2     Body mass index is 27.09 kg/(m^2).    Physical Exam:  General Appearance: Well appearing in no acute distress  Head:   Normocephalic, without obvious abnormality  Eyes:    No scleral " icterus, EOMI  ENT: Neck supple, Lips, mucosa, and tongue normal; teeth and gums normal  Lungs: CTA bilaterally in anterior and posterior fields, no wheezes, no crackles.  Heart:  Regular rate and rhythm, S1, S2 normal, no murmurs heard  Abdomen: Soft, non tender, non distended with positive bowel sounds in all four quadrants. No hepatosplenomegaly, ascites, or mass  Extremities: 2+ pulses, no clubbing, cyanosis or edema  Skin: No rash  Neurologic: CN II-XII intact, alert, oriented x 3. No asterixis      Labs:  Lab Results   Component Value Date    WBC 3.28 (L) 01/17/2017    HGB 10.7 (L) 01/17/2017    HCT 30.9 (L) 01/17/2017     (L) 01/17/2017    INR 1.3 (H) 01/17/2017     CMP  No results found for: NA, K, CL, CO2, GLU, BUN, CREATININE, CALCIUM, PROT, ALBUMIN, BILITOT, ALKPHOS, AST, ALT, ANIONGAP, ESTGFRAFRICA, EGFRNONAA  Imaging:       Endoscopy:      Assessment:  1. Cirrhosis of liver with ascites, unspecified hepatic cirrhosis type    -  Hep C, decompensated cirrhosis, with ascites. On lasix and spironolactone.   -  HCV genotype 1a, treatment naive, will need Harvoni. X 12 weeks, but will wait to treat after transplant.   -  Ascites: had paracentesis x 2.     -  MELD labs today  -  Will start transplant eval if MELD >15.     Plan:  _ Paracentesis, TIPS, diuretic use, transplant - all discussed with patient and grandson, Hernandez, cellphone [phone], and [phone].   -  Low salt in the diet, avoid canned, bottled and processed foods.  -  Continue current meds  -  CBC, CMP, PT INR q 2 months, start today  -  Ultrasound of abdomen and AFP every 6 months, starting today, next due in July 2017.  -  Avoid alcohol, smoking, sedatives and meds with codeine.  -  Avoid high intake of Tylenol (more than 4 extra-strength pills in one day)  -  Call us if any bleeding, fevers, confusion, disorientation occur  -  Endoscopy: per Gi service here - give appt. Order placed.   -  Transplant option discussed, will  evaluate when MELD >15.  -  Return in 2 months.       Return in about 2 months (around 4/20/2017).      Order summary:  Orders Placed This Encounter   Procedures    US Abdomen Limited    CBC auto differential    Comprehensive metabolic panel    Protime-INR    AFP tumor marker       Thank you so much for allowing me to participate in the care of Clarisa Chun MD

## 2017-02-20 NOTE — Clinical Note
Plan: -  Low salt in the diet, avoid canned, bottled and processed foods. -  Continue current meds -  CBC, CMP, PT INR q 2 months, start today -  Ultrasound of abdomen and AFP every 6 months, starting today, next due in July 2017. -  Avoid alcohol, smoking, sedatives and meds with codeine. -  Avoid high intake of Tylenol (more than 4 extra-strength pills in one day) -  Call us if any bleeding, fevers, confusion, disorientation occur -  Endoscopy: per Gi service here - give appt. Order placed.   -  Transplant option discussed, will evaluate when MELD >15. -  Return in 2 months.

## 2017-02-21 ENCOUNTER — TELEPHONE (OUTPATIENT)
Dept: HEPATOLOGY | Facility: CLINIC | Age: 72
End: 2017-02-21

## 2017-02-21 DIAGNOSIS — K74.60 HEPATIC CIRRHOSIS, UNSPECIFIED HEPATIC CIRRHOSIS TYPE: Primary | ICD-10-CM

## 2017-02-21 NOTE — TELEPHONE ENCOUNTER
MA called patient grandson ITALIA to inform patient lab results, grandson understood and verbalized understanding.KARI

## 2017-02-21 NOTE — TELEPHONE ENCOUNTER
----- Message from Rubia Chun MD sent at 2/20/2017 11:21 PM CST -----  MELD-Na score: 14 at 2/20/2017 12:11 PM  MELD score: 14 at 2/20/2017 12:11 PM  Calculated from:  Serum Creatinine: 1.3 mg/dL at 2/20/2017 12:11 PM  Serum Sodium: 137 mmol/L at 2/20/2017 12:11 PM  Total Bilirubin: 1.7 mg/dL at 2/20/2017 12:11 PM  INR(ratio): 1.3 at 2/20/2017 12:11 PM  Age: 71 years    Please inform patient's grandson, see his cell phone number in my note under plan, that patient's meld score is 14, we will have to wait a little bit before we repeat labs in a month, and if meld is above 15, we will start transplant evaluation.  Tumor marker is normal.

## 2017-02-27 ENCOUNTER — HOSPITAL ENCOUNTER (OUTPATIENT)
Dept: INTERVENTIONAL RADIOLOGY/VASCULAR | Facility: HOSPITAL | Age: 72
Discharge: HOME | End: 2017-02-27
Attending: INTERNAL MEDICINE
Payer: MEDICARE

## 2017-02-27 VITALS
SYSTOLIC BLOOD PRESSURE: 104 MMHG | HEART RATE: 96 BPM | DIASTOLIC BLOOD PRESSURE: 53 MMHG | OXYGEN SATURATION: 100 % | RESPIRATION RATE: 18 BRPM

## 2017-02-27 DIAGNOSIS — K74.60 CIRRHOSIS OF LIVER WITH ASCITES, UNSPECIFIED HEPATIC CIRRHOSIS TYPE: ICD-10-CM

## 2017-02-27 DIAGNOSIS — R18.8 CIRRHOSIS OF LIVER WITH ASCITES, UNSPECIFIED HEPATIC CIRRHOSIS TYPE: ICD-10-CM

## 2017-02-27 PROCEDURE — P9047 ALBUMIN (HUMAN), 25%, 50ML: HCPCS | Performed by: INTERNAL MEDICINE

## 2017-02-27 PROCEDURE — 49083 ABD PARACENTESIS W/IMAGING: CPT | Mod: GC,,, | Performed by: RADIOLOGY

## 2017-02-27 PROCEDURE — 63600175 PHARM REV CODE 636 W HCPCS: Performed by: INTERNAL MEDICINE

## 2017-02-27 PROCEDURE — A7048 VACUUM DRAIN BOTTLE/TUBE KIT: HCPCS

## 2017-02-27 PROCEDURE — C1729 CATH, DRAINAGE: HCPCS

## 2017-02-27 RX ORDER — ALBUMIN HUMAN 250 G/1000ML
25 SOLUTION INTRAVENOUS ONCE
Status: COMPLETED | OUTPATIENT
Start: 2017-02-27 | End: 2017-02-27

## 2017-02-27 RX ADMIN — ALBUMIN (HUMAN) 25 G: 25 SOLUTION INTRAVENOUS at 09:02

## 2017-02-27 NOTE — H&P
Radiology History & Physical      SUBJECTIVE:     Chief Complaint: Cirrhosis with large ascites causing respiratory difficulty    History of Present Illness:  Clarisa Shi is a 71 y.o. female who presents for image guided paracentesis.    Past Medical History:   Diagnosis Date    Ascites     Cirrhosis     Hepatitis     Hep C deja 1a, treatment naive     No past surgical history on file.    Home Meds:   Prior to Admission medications    Medication Sig Start Date End Date Taking? Authorizing Provider   furosemide (LASIX) 80 MG tablet Take 80 mg by mouth 2 (two) times daily. 1/29/17   Historical Provider, MD   ibuprofen (ADVIL,MOTRIN) 200 MG tablet Take 200 mg by mouth every 6 (six) hours as needed.    Historical Provider, MD   multivitamin (THERAGRAN) per tablet Take 1 tablet by mouth once daily.    Historical Provider, MD   spironolactone (ALDACTONE) 100 MG tablet Take 100 mg by mouth 2 (two) times daily.    Historical Provider, MD   tramadol (ULTRAM) 50 mg tablet Take 1 tablet (50 mg total) by mouth every 6 (six) hours as needed for Pain. 3/30/13   Damion Cruz MD     Anticoagulants/Antiplatelets: no anticoagulation    Allergies: Review of patient's allergies indicates:  No Known Allergies  Sedation History:  no adverse reactions    Review of Systems:   Hematological: no known coagulopathies  Respiratory: no shortness of breath  Cardiovascular: no chest pain  Gastrointestinal: no abdominal pain  Genito-Urinary: no dysuria  Musculoskeletal: negative  Neurological: no TIA or stroke symptoms         OBJECTIVE:     Vital Signs (Most Recent)       Physical Exam:    General: no acute distress  Mental Status: alert and oriented to person, place and time  HEENT: normocephalic, atraumatic  Chest: unlabored breathing  Heart: regular heart rate  Abdomen: nondistended  Extremity: moves all extremities    Laboratory  Lab Results   Component Value Date    INR 1.3 (H) 02/20/2017       Lab Results   Component Value  Date    WBC 3.19 (L) 02/20/2017    HGB 11.6 (L) 02/20/2017    HCT 35.6 (L) 02/20/2017    MCV 96 02/20/2017     02/20/2017      Lab Results   Component Value Date    GLU 97 02/20/2017     02/20/2017    K 3.8 02/20/2017     02/20/2017    CO2 27 02/20/2017    BUN 21 02/20/2017    CREATININE 1.3 02/20/2017    CALCIUM 8.4 (L) 02/20/2017    ALT 20 02/20/2017    AST 45 (H) 02/20/2017    ALBUMIN 2.4 (L) 02/20/2017    BILITOT 1.7 (H) 02/20/2017       ASSESSMENT/PLAN:     Sedation Plan: Local only.  Patient will undergo image guided paracentesis.    Taye Andersen  Radiology  PGY-3

## 2017-02-27 NOTE — IP AVS SNAPSHOT
[facility]  [address]  [facility] [address]  Phone: [phone]           Patient Discharge Instructions     Our goal is to set you up for success. This packet includes information on your condition, medications, and your home care. It will help you to care for yourself so you don't get sicker and need to go back to the hospital.     Please ask your nurse if you have any questions.        There are many details to remember when preparing to leave the hospital. Here is what you will need to do:    1. Take your medicine. If you are prescribed medications, review your Medication List in the following pages. You may have new medications to  at the pharmacy and others that you'll need to stop taking. Review the instructions for how and when to take your medications. Talk with your doctor or nurses if you are unsure of what to do.     2. Go to your follow-up appointments. Specific follow-up information is listed in the following pages. Your may be contacted by a transition nurse or clinical provider about future appointments. Be sure we have all of the phone numbers to reach you, if needed. Please contact your provider's office if you are unable to make an appointment.     3. Watch for warning signs. Your doctor or nurse will give you detailed warning signs to watch for and when to call for assistance. These instructions may also include educational information about your condition. If you experience any of warning signs to your health, call your doctor.               [facility]  Unless otherwise directed by your provider, please contact [facility], our nurse care line that is available for 24/7 assistance.     [phone] (toll-free)    Registered nurses in the [facility] provide clinical advisement, health education, appointment booking, and other advisory services.                    ** Verify the list of medication(s) below is accurate and up  to date. Carry this with you in case of emergency. If your medications have changed, please notify your healthcare provider.             Medication List      TAKE these medications        Additional Info                      furosemide 80 MG tablet   Commonly known as:  LASIX   Refills:  0   Dose:  80 mg    Instructions:  Take 80 mg by mouth 2 (two) times daily.     Begin Date    AM    Noon    PM    Bedtime       ibuprofen 200 MG tablet   Commonly known as:  ADVIL,MOTRIN   Refills:  0   Dose:  200 mg    Instructions:  Take 200 mg by mouth every 6 (six) hours as needed.     Begin Date    AM    Noon    PM    Bedtime       multivitamin per tablet   Commonly known as:  THERAGRAN   Refills:  0   Dose:  1 tablet    Instructions:  Take 1 tablet by mouth once daily.     Begin Date    AM    Noon    PM    Bedtime       spironolactone 100 MG tablet   Commonly known as:  ALDACTONE   Refills:  0   Dose:  100 mg    Instructions:  Take 100 mg by mouth 2 (two) times daily.     Begin Date    AM    Noon    PM    Bedtime       tramadol 50 mg tablet   Commonly known as:  ULTRAM   Quantity:  10 tablet   Refills:  0   Dose:  50 mg    Instructions:  Take 1 tablet (50 mg total) by mouth every 6 (six) hours as needed for Pain.     Begin Date    AM    Noon    PM    Bedtime                  Please bring to all follow up appointments:    1. A copy of your discharge instructions.  2. All medicines you are currently taking in their original bottles.  3. Identification and insurance card.    Please arrive 15 minutes ahead of scheduled appointment time.    Please call 24 hours in advance if you must reschedule your appointment and/or time.        Your Scheduled Appointments     Mar 10, 2017  1:15 PM CST   Non-Fasting Lab with LAB, APPOINTMENT [place] [facility] ([facility])    [address]  [facility] [address]   [phone]              Your Future Surgeries/Procedures     Mar 10, 2017   Surgery  with Richie Ngo MD   [facility] ([facility])    [address] [address] [facility] [address] [phone]                Discharge References/Attachments     PARACENTESIS, DISCHARGE INSTRUCTIONS FOR (ENGLISH)        Admission Information     Date & Time Provider Department CSN    2/27/2017  8:00 AM Rubia Chun MD [facility] [id number]      Care Providers     Provider Role Specialty Primary office phone    Rubia Chun MD Attending Provider Gastroenterology [phone]      Your Vitals Were     BP                   125/55 (BP Location: Left arm, Patient Position: Lying, BP Method: Automatic)           Recent Lab Values     No lab values to display.      Allergies as of 2/27/2017     No Known Allergies      Advance Directives     An advance directive is a document which, in the event you are no longer able to make decisions for yourself, tells your healthcare team what kind of treatment you do or do not want to receive, or who you would like to make those decisions for you.  If you do not currently have an advance directive, [facility] encourages you to create one.  For more information call:  [phone]), [phone] ([phone]),  or log on to [email or web address].        Smoking Cessation     If you would like to quit smoking:   You may be eligible for free services if you are a [place] resident and started smoking cigarettes before September 1, 1988.  Call the Smoking Cessation Trust ([facility]) toll free at (135) 219-5412 or (294) 872-9617.   Call 1-800-QUIT-NOW if you do not meet the above criteria.            Language Assistance Services     ATTENTION: Language assistance services are available, free of charge. Please call [phone].      ATENCIÓN: Si habla español, tiene a ahumada disposición servicios gratuitos de asistencia lingüística. Llame al [phone].     CHÚ Ý: N?u b?n nói Ti?ng Vi?t, có các d?ch v? h? tr? ngôn  ng? mi?n phí amishh cho b?n. G?i s? [phone].        [facility] Sign-Up     Activating your [facility] account is as easy as 1-2-3!     1) Visit [email or web address], select Sign Up Now, enter this activation code and your date of birth, then select Next.  [email or web address]  Expires: 3/3/2017 11:25 AM      2) Create a username and password to use when you visit [facility] in the future and select a security question in case you lose your password and select Next.    3) Enter your e-mail address and click Sign Up!    Additional Information  If you have questions, please e-mail [email or web address] or call [phone] to talk to our [facility] staff. Remember, [facility] is NOT to be used for urgent needs. For medical emergencies, dial 911.          [facility] complies with applicable Federal civil rights laws and does not discriminate on the basis of race, color, national origin, age, disability, or sex.

## 2017-02-27 NOTE — PROCEDURES
Radiology Post-Procedure Note    Pre Op Diagnosis: Ascites  Post Op Diagnosis: Same    Procedure: Paracentesis    Procedure performed by: Rigoberto Hernandez MD    Written Informed Consent Obtained: Yes  Specimen Removed: YES clear yellow fluid; see dictation for volume removed  Estimated Blood Loss: Minimal    Findings:   Successful paracentesis.  Albumin administered PRN per protocol.    Patient tolerated procedure well.    Jozef Poon  Radiology PGY-3

## 2017-03-01 ENCOUNTER — TELEPHONE (OUTPATIENT)
Dept: HEPATOLOGY | Facility: CLINIC | Age: 72
End: 2017-03-01

## 2017-03-01 NOTE — TELEPHONE ENCOUNTER
----- Message from Rubia Chun MD sent at 2/28/2017  4:06 PM CST -----  Please inform patient:  I am sad to have to let her know that our Transplant  informed me that her insurance does not cover her transplant to be done at [facility] but she could go to [facility] for her transplant.  I will look into financial coverage for TIPS procedure at [facility].  If that is not possible, I recommend her getting an appointment with hepatology at [facility].  I will communicate this to her primary care provider, Dr. Lopez at [facility].  She can continue to come to [facility] for paracentesis until she gets the appointment at [facility], after that she may want to go to [facility] for her paracentesis as well.     MELD-Na score: 14 at 2/20/2017 12:11 PM  MELD score: 14 at 2/20/2017 12:11 PM  Calculated from:  Serum Creatinine: 1.3 mg/dL at 2/20/2017 12:11 PM  Serum Sodium: 137 mmol/L at 2/20/2017 12:11 PM  Total Bilirubin: 1.7 mg/dL at 2/20/2017 12:11 PM  INR(ratio): 1.3 at 2/20/2017 12:11 PM  Age: 71 years

## 2017-03-01 NOTE — TELEPHONE ENCOUNTER
Patient needs frequent (every 2 weeks) paracentesis with removal of 10-11 liters of fluid.  I stressed importance dietary restriction of sodium and fluids.  Considering TIPS as MELD has been low.  No h/o or current encephalopathy.      Latest MELD 14 on 2/20/17.      Does not have insurance coverage here for liver transplant, will have to go [facility].  Does have coverage here for TIPS.      Will call patient and her grandson.

## 2017-03-01 NOTE — TELEPHONE ENCOUNTER
MA attempted to call patient, patient unable to reached left her VM to please give us a callback. KARI

## 2017-03-01 NOTE — TELEPHONE ENCOUNTER
ITALIA patient grandson called back, he stated that patient does not have a phone and this is the phone that she use. I have relay the message to ITALIA regarding Dr. Chun's message. He understood and stated that he will inform patient about this. KARI

## 2017-03-10 ENCOUNTER — ANESTHESIA EVENT (OUTPATIENT)
Dept: ENDOSCOPY | Facility: HOSPITAL | Age: 72
End: 2017-03-10
Payer: MEDICARE

## 2017-03-10 ENCOUNTER — HOSPITAL ENCOUNTER (OUTPATIENT)
Facility: HOSPITAL | Age: 72
Discharge: HOME | End: 2017-03-10
Attending: INTERNAL MEDICINE | Admitting: INTERNAL MEDICINE
Payer: MEDICARE

## 2017-03-10 ENCOUNTER — ANESTHESIA (OUTPATIENT)
Dept: ENDOSCOPY | Facility: HOSPITAL | Age: 72
End: 2017-03-10
Payer: MEDICARE

## 2017-03-10 ENCOUNTER — SURGERY (OUTPATIENT)
Age: 72
End: 2017-03-10

## 2017-03-10 VITALS
WEIGHT: 182 LBS | DIASTOLIC BLOOD PRESSURE: 65 MMHG | TEMPERATURE: 97 F | HEIGHT: 65 IN | HEART RATE: 79 BPM | SYSTOLIC BLOOD PRESSURE: 137 MMHG | RESPIRATION RATE: 16 BRPM | OXYGEN SATURATION: 100 % | RESPIRATION RATE: 32 BRPM | BODY MASS INDEX: 30.32 KG/M2

## 2017-03-10 DIAGNOSIS — K74.60 HEPATIC CIRRHOSIS, UNSPECIFIED HEPATIC CIRRHOSIS TYPE: Primary | ICD-10-CM

## 2017-03-10 DIAGNOSIS — K74.60 CIRRHOSIS: ICD-10-CM

## 2017-03-10 PROCEDURE — 37000008 HC ANESTHESIA 1ST 15 MINUTES: Performed by: INTERNAL MEDICINE

## 2017-03-10 PROCEDURE — 25000003 PHARM REV CODE 250: Performed by: NURSE ANESTHETIST, CERTIFIED REGISTERED

## 2017-03-10 PROCEDURE — 43235 EGD DIAGNOSTIC BRUSH WASH: CPT | Mod: ,,, | Performed by: INTERNAL MEDICINE

## 2017-03-10 PROCEDURE — 37000009 HC ANESTHESIA EA ADD 15 MINS: Performed by: INTERNAL MEDICINE

## 2017-03-10 PROCEDURE — 63600175 PHARM REV CODE 636 W HCPCS: Performed by: NURSE ANESTHETIST, CERTIFIED REGISTERED

## 2017-03-10 PROCEDURE — 25000003 PHARM REV CODE 250: Performed by: INTERNAL MEDICINE

## 2017-03-10 PROCEDURE — D9220A PRA ANESTHESIA: Mod: CRNA,,, | Performed by: NURSE ANESTHETIST, CERTIFIED REGISTERED

## 2017-03-10 PROCEDURE — 43235 EGD DIAGNOSTIC BRUSH WASH: CPT | Performed by: INTERNAL MEDICINE

## 2017-03-10 PROCEDURE — D9220A PRA ANESTHESIA: Mod: ANES,,, | Performed by: ANESTHESIOLOGY

## 2017-03-10 RX ORDER — PROPOFOL 10 MG/ML
VIAL (ML) INTRAVENOUS CONTINUOUS PRN
Status: DISCONTINUED | OUTPATIENT
Start: 2017-03-10 | End: 2017-03-10

## 2017-03-10 RX ORDER — SODIUM CHLORIDE 9 MG/ML
INJECTION, SOLUTION INTRAVENOUS CONTINUOUS
Status: DISCONTINUED | OUTPATIENT
Start: 2017-03-10 | End: 2017-03-10 | Stop reason: HOSPADM

## 2017-03-10 RX ORDER — OMEPRAZOLE 20 MG/1
20 CAPSULE, DELAYED RELEASE ORAL
Qty: 90 CAPSULE | Refills: 3 | Status: SHIPPED | OUTPATIENT
Start: 2017-03-10 | End: 2018-03-10

## 2017-03-10 RX ORDER — LIDOCAINE HCL/PF 100 MG/5ML
SYRINGE (ML) INTRAVENOUS
Status: DISCONTINUED | OUTPATIENT
Start: 2017-03-10 | End: 2017-03-10

## 2017-03-10 RX ORDER — PROPOFOL 10 MG/ML
VIAL (ML) INTRAVENOUS
Status: DISCONTINUED | OUTPATIENT
Start: 2017-03-10 | End: 2017-03-10

## 2017-03-10 RX ADMIN — LIDOCAINE HYDROCHLORIDE 100 MG: 20 INJECTION, SOLUTION INTRAVENOUS at 04:03

## 2017-03-10 RX ADMIN — SODIUM CHLORIDE: 0.9 INJECTION, SOLUTION INTRAVENOUS at 02:03

## 2017-03-10 RX ADMIN — LIDOCAINE HYDROCHLORIDE 20 MG: 20 INJECTION, SOLUTION INTRAVENOUS at 04:03

## 2017-03-10 RX ADMIN — PROPOFOL 70 MG: 10 INJECTION, EMULSION INTRAVENOUS at 04:03

## 2017-03-10 RX ADMIN — PROPOFOL 150 MCG/KG/MIN: 10 INJECTION, EMULSION INTRAVENOUS at 04:03

## 2017-03-10 NOTE — DISCHARGE INSTRUCTIONS

## 2017-03-10 NOTE — ANESTHESIA PREPROCEDURE EVALUATION
03/10/2017  Clarisa Shi is a 71 y.o., female.    [facility] Anesthesia Evaluation         Review of Systems      Physical Exam  General:  Malnutrition, Cachexia    Airway/Jaw/Neck:  Airway Findings: Mouth Opening: Normal Tongue: Normal  General Airway Assessment: Adult  Mallampati: II  Improves to II with phonation.  TM Distance: Normal, at least 6 cm       Chest/Lungs:  Chest/Lungs Findings: Clear to auscultation, Normal Respiratory Rate     Heart/Vascular:  Heart Findings: Rate: Normal  Rhythm: Regular Rhythm        Mental Status:  Mental Status Findings:  Somnolent         Anesthesia Plan  Type of Anesthesia, risks & benefits discussed:  Anesthesia Type:  general, MAC  Patient's Preference: either  Intra-op Monitoring Plan:   Intra-op Monitoring Plan Comments:   Post Op Pain Control Plan:   Post Op Pain Control Plan Comments:   Induction:   IV  Beta Blocker:  Patient is not currently on a Beta-Blocker (No further documentation required).       Informed Consent: Patient understands risks and agrees with Anesthesia plan.  Questions answered. Anesthesia consent signed with patient.  ASA Score: 2     Day of Surgery Review of History & Physical:    H&P update referred to the surgeon.         Ready For Surgery From Anesthesia Perspective.

## 2017-03-10 NOTE — H&P (VIEW-ONLY)
[facility] Consultation Note    Reason for Consult:  The encounter diagnosis was Cirrhosis of liver with ascites, unspecified hepatic cirrhosis type.    PCP: Primary Doctor No       HPI:  This is a 71 y.o. female here for evaluation of: hep C, cirrhosis, ascites.    72 y/o AA female, with hep C and cirrhosis diagnosed within the last month when she presented to her PCP at [email or web address] in [place].  She had a large ascites last week when I saw her in the Hepatitis C clinic. She was given lasix and spironolactone last week and told to come to [facility] for management of end-stage liver disease, as she may need liver transplant.        Her Hep C genotype is 1a, viral load 551,000 IU/mL, she is treatment naive, platelet count was 142, albumin 2.4, INR 1.5, AFP 5.8.       Patient had paracentesis today with drainage of about 4 bottles, (report not yet available).  U/S was ordered, did not get done, because she could not get a ride.  Her grandson will bring her here, she also plans on getting to our clinic, but he works in [employer], and he can usually bring her here when he comes to work.  She can also get a city bus.    Elevated liver enzymes: No  Abnormal imaging: No  Cirrhosis: Yes  Hepatitis C: Yes  Hepatitis B: No  Fatty liver: No  Encephalopathy: No  Post-hospital discharge: No  Symptoms: abd distention    Primary hepatic manifestations:  Fatigue:No  Edema:No  Ascites:Yes  Encephalopathy:No  Abdominal pain:No  GI bleeds: No  Pruritus:No  Weight Changes:Yes  Changes in Bowel habits: No  Muscle cramps:No    Risk factors for liver disease:  No jaundice  No transfusions  No IVDU  Did not snort cocaine or similar agents  Did not live with anyone with hepatitis B or C  Sexual partner not tested  No hepatotoxic medications  No exposure to industrial toxins  Alcohol: none      ROS:  Constitutional: No fevers, chills, weight changes, fatigue  ENT: No allergies, nosebleeds,   CV: No chest  "pain  Pulm: No cough, shortness of breath  Ophtho: No vision changes  GI/Liver: see HPI - abd distention  Derm: No rash, itching  Heme: No swollen glands, bruising  MSK: No joint pains, joint swelling  : No dysuria, hematuria, decrease in urine output  Endo: No hot or cold intolerance  Neuro: No confusion, disorientation, difficulty with sleep, memory, concentration, syncope, seizure  Psych: No anxiety, depression    Medical History:  has a past medical history of Ascites; Cirrhosis; and Hepatitis.    Surgical History:  has no past surgical history on file.    Family History: family history is not on file.. Parents , no liver cancer or cirrhosis.     Social History:  reports that she quit smoking about 2 months ago. She has never used smokeless tobacco. She reports that she drinks alcohol. She reports that she does not use illicit drugs.    Review of patient's allergies indicates:  No Known Allergies    Current Outpatient Prescriptions   Medication Sig    furosemide (LASIX) 80 MG tablet Take 80 mg by mouth 2 (two) times daily.    multivitamin (THERAGRAN) per tablet Take 1 tablet by mouth once daily.    spironolactone (ALDACTONE) 100 MG tablet Take 100 mg by mouth 2 (two) times daily.    ibuprofen (ADVIL,MOTRIN) 200 MG tablet Take 200 mg by mouth every 6 (six) hours as needed.    tramadol (ULTRAM) 50 mg tablet Take 1 tablet (50 mg total) by mouth every 6 (six) hours as needed for Pain.     No current facility-administered medications for this visit.        Objective Findings:    Vital Signs:  Visit Vitals    BP (!) 121/58 (BP Location: Left arm, Patient Position: Sitting)    Pulse 82    Temp 96.9 °F (36.1 °C) (Oral)    Resp 18    Ht 5' 4" (1.626 m)    Wt 71.6 kg (157 lb 13.6 oz)    SpO2 100%    BMI 27.09 kg/m2     Body mass index is 27.09 kg/(m^2).    Physical Exam:  General Appearance: Well appearing in no acute distress  Head:   Normocephalic, without obvious abnormality  Eyes:    No scleral " icterus, EOMI  ENT: Neck supple, Lips, mucosa, and tongue normal; teeth and gums normal  Lungs: CTA bilaterally in anterior and posterior fields, no wheezes, no crackles.  Heart:  Regular rate and rhythm, S1, S2 normal, no murmurs heard  Abdomen: Soft, non tender, non distended with positive bowel sounds in all four quadrants. No hepatosplenomegaly, ascites, or mass  Extremities: 2+ pulses, no clubbing, cyanosis or edema  Skin: No rash  Neurologic: CN II-XII intact, alert, oriented x 3. No asterixis      Labs:  Lab Results   Component Value Date    WBC 3.28 (L) 01/17/2017    HGB 10.7 (L) 01/17/2017    HCT 30.9 (L) 01/17/2017     (L) 01/17/2017    INR 1.3 (H) 01/17/2017     CMP  No results found for: NA, K, CL, CO2, GLU, BUN, CREATININE, CALCIUM, PROT, ALBUMIN, BILITOT, ALKPHOS, AST, ALT, ANIONGAP, ESTGFRAFRICA, EGFRNONAA  Imaging:       Endoscopy:      Assessment:  1. Cirrhosis of liver with ascites, unspecified hepatic cirrhosis type    -  Hep C, decompensated cirrhosis, with ascites. On lasix and spironolactone.   -  HCV genotype 1a, treatment naive, will need Harvoni. X 12 weeks, but will wait to treat after transplant.   -  Ascites: had paracentesis x 2.     -  MELD labs today  -  Will start transplant eval if MELD >15.     Plan:  _ Paracentesis, TIPS, diuretic use, transplant - all discussed with patient and grandson, Hernandez, cellphone [phone], and [phone].   -  Low salt in the diet, avoid canned, bottled and processed foods.  -  Continue current meds  -  CBC, CMP, PT INR q 2 months, start today  -  Ultrasound of abdomen and AFP every 6 months, starting today, next due in July 2017.  -  Avoid alcohol, smoking, sedatives and meds with codeine.  -  Avoid high intake of Tylenol (more than 4 extra-strength pills in one day)  -  Call us if any bleeding, fevers, confusion, disorientation occur  -  Endoscopy: per Gi service here - give appt. Order placed.   -  Transplant option discussed, will  evaluate when MELD >15.  -  Return in 2 months.       Return in about 2 months (around 4/20/2017).      Order summary:  Orders Placed This Encounter   Procedures    US Abdomen Limited    CBC auto differential    Comprehensive metabolic panel    Protime-INR    AFP tumor marker       Thank you so much for allowing me to participate in the care of Clarisa Chun MD

## 2017-03-10 NOTE — IP AVS SNAPSHOT
[facility]  [address]  [facility] [address]  Phone: [phone]           Patient Discharge Instructions     Our goal is to set you up for success. This packet includes information on your condition, medications, and your home care. It will help you to care for yourself so you don't get sicker and need to go back to the hospital.     Please ask your nurse if you have any questions.        There are many details to remember when preparing to leave the hospital. Here is what you will need to do:    1. Take your medicine. If you are prescribed medications, review your Medication List in the following pages. You may have new medications to  at the pharmacy and others that you'll need to stop taking. Review the instructions for how and when to take your medications. Talk with your doctor or nurses if you are unsure of what to do.     2. Go to your follow-up appointments. Specific follow-up information is listed in the following pages. Your may be contacted by a transition nurse or clinical provider about future appointments. Be sure we have all of the phone numbers to reach you, if needed. Please contact your provider's office if you are unable to make an appointment.     3. Watch for warning signs. Your doctor or nurse will give you detailed warning signs to watch for and when to call for assistance. These instructions may also include educational information about your condition. If you experience any of warning signs to your health, call your doctor.               [facility]  Unless otherwise directed by your provider, please contact [facility], our nurse care line that is available for 24/7 assistance.     [phone] (toll-free)    Registered nurses in the [facility] provide clinical advisement, health education, appointment booking, and other advisory services.                    ** Verify the list of medication(s) below is accurate and up  to date. Carry this with you in case of emergency. If your medications have changed, please notify your healthcare provider.             Medication List      CONTINUE taking these medications        Additional Info                      furosemide 80 MG tablet   Commonly known as:  LASIX   Refills:  0   Dose:  80 mg    Instructions:  Take 80 mg by mouth 2 (two) times daily.     Begin Date    AM    Noon    PM    Bedtime       ibuprofen 200 MG tablet   Commonly known as:  ADVIL,MOTRIN   Refills:  0   Dose:  200 mg    Instructions:  Take 200 mg by mouth every 6 (six) hours as needed.     Begin Date    AM    Noon    PM    Bedtime       multivitamin per tablet   Commonly known as:  THERAGRAN   Refills:  0   Dose:  1 tablet    Instructions:  Take 1 tablet by mouth once daily.     Begin Date    AM    Noon    PM    Bedtime       spironolactone 100 MG tablet   Commonly known as:  ALDACTONE   Refills:  0   Dose:  100 mg    Instructions:  Take 100 mg by mouth 2 (two) times daily.     Begin Date    AM    Noon    PM    Bedtime       tramadol 50 mg tablet   Commonly known as:  ULTRAM   Quantity:  10 tablet   Refills:  0   Dose:  50 mg    Instructions:  Take 1 tablet (50 mg total) by mouth every 6 (six) hours as needed for Pain.     Begin Date    AM    Noon    PM    Bedtime                  Please bring to all follow up appointments:    1. A copy of your discharge instructions.  2. All medicines you are currently taking in their original bottles.  3. Identification and insurance card.    Please arrive 15 minutes ahead of scheduled appointment time.    Please call 24 hours in advance if you must reschedule your appointment and/or time.          Discharge Instructions     Future Orders    Activity as tolerated     Call MD for:  difficulty breathing, headache or visual disturbances     Call MD for:  persistent nausea and vomiting     Call MD for:  severe uncontrolled pain     Call MD for:  temperature >100.4     Diet general      Questions:    Total calories:      Fat restriction, if any:      Protein restriction, if any:      Na restriction, if any:      Fluid restriction:      Additional restrictions:          Discharge Instructions         Upper GI Endoscopy     During endoscopy, a long, flexible tube is used to view the inside of your upper GI tract.      Upper GI endoscopy allows your healthcare provider to look directly into the beginning of your gastrointestinal (GI) tract. The esophagus, stomach, and duodenum (the first part of the small intestine) make up the upper GI tract.   Before the exam  Follow these and any other instructions you are given before your endoscopy. If you dont follow the healthcare providers instructions carefully, the test may need to be canceled or done over:  · Don't eat or drink anything after midnight the night before your exam. If your exam is in the afternoon, drink only clear liquids in the morning. Don't eat or drink anything for 8 hours before the exam. In some cases, you may be able to take medicines with sips of water until 2 hours before the procedure. Speak with your healthcare provider about this.   · Bring your X-rays and any other test results you have.  · Because you will be sedated, arrange for an adult to drive you home after the exam.  · Tell your healthcare provider before the exam if you are taking any medicines or have any medical problems.  The procedure  Here is what to expect:  · You will lie on the endoscopy table. Usually patients lie on the left side.  · You will be monitored and given oxygen.  · Your throat may be numbed with a spray or gargle. You are given medicine through an intravenous (IV) line that will help you relax and remain comfortable. You may be awake or asleep during the procedure.  · The healthcare provider will put the endoscope in your mouth and down your esophagus. It is thinner than most pieces of food that you swallow. It will not affect your breathing. The  "medicine helps keep you from gagging.  · Air is put into your GI tract to expand it. It can make you burp.  · During the procedure, the healthcare provider can take biopsies (tissue samples), remove abnormalities, such as polyps, or treat abnormalities through a variety of devices placed through the endoscope. You will not feel this.   · The endoscope carries images of your upper GI tract to a video screen. If you are awake, you may be able to look at the images.  · After the procedure is done, you will rest for a time. An adult must drive you home.  When to call your healthcare provider  Contact your healthcare provider if you have:  · Black or tarry stools, or blood in your stool  · Fever  · Pain in your belly that does not go away  · Nausea and vomiting, or vomiting blood   Date Last Reviewed: 7/1/2016  © 4633-9342 [employer]. [address] [place] [address]. All rights reserved. This information is not intended as a substitute for professional medical care. Always follow your healthcare professional's instructions.            Admission Information     Date & Time Provider Department CSN    3/10/2017  2:03 PM Uriel Haji MD [facility] [id number]      Care Providers     Provider Role Specialty Primary office phone    Uriel Haji MD Attending Provider Gastroenterology [phone]    Uriel Haji MD Surgeon  Gastroenterology [phone]      Your Vitals Were     BP Pulse Temp Resp Height Weight    147/76 85 97.1 °F (36.2 °C) (Axillary) 16 5' 5" (1.651 m) 82.6 kg (182 lb)    SpO2 BMI             95% 30.29 kg/m2         Recent Lab Values     No lab values to display.      Allergies as of 3/10/2017     No Known Allergies      Advance Directives     An advance directive is a document which, in the event you are no longer able to make decisions for yourself, tells your healthcare team what kind of treatment you do or do not want to receive, or who you would like " to make those decisions for you.  If you do not currently have an advance directive, [facility] encourages you to create one.  For more information call:  [phone] ([phone]), [phone] ([phone]),  or log on to [email or web address].        Language Assistance Services     ATTENTION: Language assistance services are available, free of charge. Please call [phone].      ATENCIÓN: Si zorala darion, tiene a ahumada disposición servicios gratuitos de asistencia lingüística. Llame al [phone].     [place] Ý: N?u b?n nói Ti?ng Vi?t, có các d?ch v? h? tr? ngôn ng? mi?n phí dành cho b?n. G?i s? [phone].        [facility] Sign-Up     Activating your [facility] account is as easy as 1-2-3!     1) Visit [email or web address], select Sign Up Now, enter this activation code and your date of birth, then select Next.  [email or web address]  Expires: 4/24/2017  5:19 PM      2) Create a username and password to use when you visit [facility] in the future and select a security question in case you lose your password and select Next.    3) Enter your e-mail address and click Sign Up!    Additional Information  If you have questions, please e-mail [email or web address] or call [phone] to talk to our [facility] staff. Remember, [facility] is NOT to be used for urgent needs. For medical emergencies, dial 911.          [facility] complies with applicable Federal civil rights laws and does not discriminate on the basis of race, color, national origin, age, disability, or sex.

## 2017-03-10 NOTE — INTERVAL H&P NOTE
Pre-Procedure H and P Addendum    Patient seen and examined.  History and exam unchanged from prior history and physical.      Procedure: EGD  Indication: Cirrhosis, variceal screening  ASA Class: per anesthesiology  Airway: normal  Neck Mobility: full range of motion  Mallampatti score: per anesthesia  History of anesthesia problems: no  Family history of anesthesia problems: no  Anesthesia Plan: MAC    Anesthesia/Surgery risks, benefits and alternative options discussed and understood by patient/family.          Active Hospital Problems    Diagnosis  POA    Cirrhosis [K74.60]  Yes      Resolved Hospital Problems    Diagnosis Date Resolved POA   No resolved problems to display.

## 2017-03-10 NOTE — TRANSFER OF CARE
"Anesthesia Transfer of Care Note    Patient: Clarisa Shi    Procedure(s) Performed: Procedure(s) (LRB):  ESOPHAGOGASTRODUODENOSCOPY (EGD) (N/A)    Patient location: PACU    Anesthesia Type: general    Transport from OR: Transported from OR on room air with adequate spontaneous ventilation    Post pain: adequate analgesia    Post assessment: no apparent anesthetic complications    Post vital signs: stable    Level of consciousness: awake    Nausea/Vomiting: no nausea/vomiting    Complications: none          Last vitals:   Visit Vitals    BP (!) 147/76    Pulse 85    Temp 36.2 °C (97.1 °F) (Axillary)    Resp 16    Ht 5' 5" (1.651 m)    Wt 82.6 kg (182 lb)    SpO2 95%    Breastfeeding No    BMI 30.29 kg/m2     "

## 2017-03-13 ENCOUNTER — TELEPHONE (OUTPATIENT)
Dept: HEPATOLOGY | Facility: CLINIC | Age: 72
End: 2017-03-13

## 2017-03-13 NOTE — TELEPHONE ENCOUNTER
----- Message from Nicholas Clifford sent at 3/13/2017 11:33 AM CDT -----  Contact: Pt   Pt would like a call back from nurse    Pt states she missed an earlier call and would like berry back ASAP    Pt can be reached at [phone]

## 2017-03-13 NOTE — ANESTHESIA POSTPROCEDURE EVALUATION
"Anesthesia Post Evaluation    Patient: Clarisa Shi    Procedure(s) Performed: Procedure(s) (LRB):  ESOPHAGOGASTRODUODENOSCOPY (EGD) (N/A)    Final Anesthesia Type: general  Patient location during evaluation: GI PACU  Patient participation: Yes- Able to Participate  Level of consciousness: awake and alert  Post-procedure vital signs: reviewed and stable  Pain management: adequate  Airway patency: patent  PONV status at discharge: No PONV  Anesthetic complications: no      Cardiovascular status: blood pressure returned to baseline  Respiratory status: unassisted  Hydration status: euvolemic  Follow-up not needed.        Visit Vitals    /65    Pulse 79    Temp 36.2 °C (97.1 °F) (Axillary)    Resp 16    Ht 5' 5" (1.651 m)    Wt 82.6 kg (182 lb)    SpO2 100%    Breastfeeding No    BMI 30.29 kg/m2       Pain/Sukumar Score: No Data Recorded      "

## 2017-03-17 ENCOUNTER — TELEPHONE (OUTPATIENT)
Dept: ENDOSCOPY | Facility: HOSPITAL | Age: 72
End: 2017-03-17

## 2017-03-20 ENCOUNTER — TELEPHONE (OUTPATIENT)
Dept: HEPATOLOGY | Facility: CLINIC | Age: 72
End: 2017-03-20

## 2017-03-20 NOTE — TELEPHONE ENCOUNTER
MA called patient back, patient would like to see Dr. Adiel MISTRY. Patient accepted 3/21/17 at 2pm. KARI

## 2017-03-20 NOTE — TELEPHONE ENCOUNTER
----- Message from Monica Tamez sent at 3/20/2017  9:56 AM CDT -----  Contact: Self  Pt is calling because she says she is beginning to see another build up of fluid in her stomach and would like to be scheduled for an appt.   unable to make appt due to an exhausted template.    She can be reached at [phone].    Thank you.

## 2017-03-22 ENCOUNTER — TELEPHONE (OUTPATIENT)
Dept: HEPATOLOGY | Facility: CLINIC | Age: 72
End: 2017-03-22

## 2017-03-22 NOTE — TELEPHONE ENCOUNTER
----- Message from Mary Ngo sent at 3/21/2017  5:26 PM CDT -----  Contact: self  Pt is calling to reschedule the appt that she missed today.  She can be reached at [phone]

## 2017-03-24 ENCOUNTER — OFFICE VISIT (OUTPATIENT)
Dept: HEPATOLOGY | Facility: CLINIC | Age: 72
End: 2017-03-24
Payer: MEDICARE

## 2017-03-24 VITALS
HEIGHT: 61 IN | HEART RATE: 95 BPM | SYSTOLIC BLOOD PRESSURE: 161 MMHG | WEIGHT: 168.44 LBS | DIASTOLIC BLOOD PRESSURE: 73 MMHG | BODY MASS INDEX: 31.8 KG/M2 | OXYGEN SATURATION: 100 %

## 2017-03-24 DIAGNOSIS — K74.60 CHRONIC HEPATITIS C WITH CIRRHOSIS: Primary | ICD-10-CM

## 2017-03-24 DIAGNOSIS — R18.8 OTHER ASCITES: ICD-10-CM

## 2017-03-24 DIAGNOSIS — B18.2 CHRONIC HEPATITIS C WITH CIRRHOSIS: Primary | ICD-10-CM

## 2017-03-24 DIAGNOSIS — K76.82 HEPATIC ENCEPHALOPATHY: ICD-10-CM

## 2017-03-24 PROCEDURE — 99213 OFFICE O/P EST LOW 20 MIN: CPT | Mod: PBBFAC | Performed by: NURSE PRACTITIONER

## 2017-03-24 PROCEDURE — 99999 PR PBB SHADOW E&M-EST. PATIENT-LVL III: CPT | Mod: PBBFAC,,, | Performed by: NURSE PRACTITIONER

## 2017-03-24 PROCEDURE — 99214 OFFICE O/P EST MOD 30 MIN: CPT | Mod: S$PBB,,, | Performed by: NURSE PRACTITIONER

## 2017-03-24 RX ORDER — LACTULOSE 10 G/15ML
10 SOLUTION ORAL; RECTAL 2 TIMES DAILY
Qty: 946 ML | Refills: 5 | Status: SHIPPED | OUTPATIENT
Start: 2017-03-24

## 2017-03-24 NOTE — MR AVS SNAPSHOT
[facility]  [address]  [facility] [address]  Phone: [phone]  Fax: [phone]                  Clarisa Shi   3/24/2017 1:00 PM   Office Visit    Description:  Female : 1945   Provider:  Estefania Rodriguez NP   Department:  [facility]           Reason for Visit     Cirrhosis           Diagnoses this Visit        Comments    Chronic hepatitis C with cirrhosis    -  Primary     Hepatic encephalopathy         Other ascites                To Do List           Goals (5 Years of Data)     None      Follow-Up and Disposition     Follow-up and Disposition History       These Medications        Disp Refills Start End    lactulose (CHRONULAC) 10 gram/15 mL solution 946 mL 5 3/24/2017     Take 15 mLs (10 g total) by mouth 2 (two) times daily. - Oral    Pharmacy: [facility] [address] [facility] [address] AT [facility] Ph #: [phone]         [facility]     [facility] On Call Nurse Care Line -  Assistance  Registered nurses in the [facility] On Call Center provide clinical advisement, health education, appointment booking, and other advisory services.  Call for this free service at [phone].             Medications           Message regarding Medications     Verify the changes and/or additions to your medication regime listed below are the same as discussed with your clinician today.  If any of these changes or additions are incorrect, please notify your healthcare provider.        START taking these NEW medications        Refills    lactulose (CHRONULAC) 10 gram/15 mL solution 5    Sig: Take 15 mLs (10 g total) by mouth 2 (two) times daily.    Class: Normal    Route: Oral           Verify that the below list of medications is an accurate representation of the medications you are currently taking.  If none reported, the list may be blank. If incorrect, please contact your healthcare provider. Carry this list  "with you in case of emergency.           Current Medications     furosemide (LASIX) 80 MG tablet Take 80 mg by mouth 2 (two) times daily.    ibuprofen (ADVIL,MOTRIN) 200 MG tablet Take 200 mg by mouth every 6 (six) hours as needed.    multivitamin (THERAGRAN) per tablet Take 1 tablet by mouth once daily.    omeprazole (PRILOSEC) 20 MG capsule Take 1 capsule (20 mg total) by mouth before breakfast.    spironolactone (ALDACTONE) 100 MG tablet Take 100 mg by mouth 2 (two) times daily.    tramadol (ULTRAM) 50 mg tablet Take 1 tablet (50 mg total) by mouth every 6 (six) hours as needed for Pain.    lactulose (CHRONULAC) 10 gram/15 mL solution Take 15 mLs (10 g total) by mouth 2 (two) times daily.           Clinical Reference Information           Your Vitals Were     BP Pulse Height Weight SpO2 BMI    161/73 (BP Location: Left arm, Patient Position: Sitting, BP Method: Automatic) 95 5' 1" (1.549 m) 76.4 kg (168 lb 6.9 oz) 100% 31.82 kg/m2      Blood Pressure          Most Recent Value    BP  (!)  161/73      Allergies as of 3/24/2017     No Known Allergies      Immunizations Administered on Date of Encounter - 3/24/2017     None      Orders Placed During Today's Visit     Future Labs/Procedures Expected by Expires    IR Paracentesis with Imaging  3/24/2017 3/25/2018    Protein, Peritoneal, Pleural Fluid or LISA Drainage, In-House Ascites  3/24/2017 5/23/2018    WBC & Diff,Body Fluid Ascites  3/24/2017 5/23/2018      [facility] Sign-Up     Activating your [facility] account is as easy as 1-2-3!     1) Visit [email or web address], select Sign Up Now, enter this activation code and your date of birth, then select Next.  [email or web address]  Expires: 4/24/2017  6:19 PM      2) Create a username and password to use when you visit [facility] in the future and select a security question in case you lose your password and select Next.    3) Enter your e-mail address and click Sign Up!    Additional Information  If you have questions, please " e-mail [email or web address] or call [phone] to talk to our [facility] staff. Remember, [facility] is NOT to be used for urgent needs. For medical emergencies, dial 911.         Language Assistance Services     ATTENTION: Language assistance services are available, free of charge. Please call [phone].      ATENCIÓN: Si habla español, tiene a ahumada disposición servicios gratuitos de asistencia lingüística. Llame al [phone].     CH[place] Ý: N?u b?n nói Ti?ng Vi?t, có các d?ch v? h? tr? ngôn ng? mi?n phí dành cho b?n. G?i s? [phone].         [facility] complies with applicable Federal civil rights laws and does not discriminate on the basis of race, color, national origin, age, disability, or sex.

## 2017-03-24 NOTE — PROGRESS NOTES
HEPATOLOGY FOLLOW UP  Clarisa Shi is here for follow up of No chief complaint on file.      HPI  Since Clarisa Shi's last visit there have been no significant developments.   She is here today for regular f/u and accompanied by her grandson who is her caregiver. Complains of abdominal distension and leg edema.  Already taking lasix and spironolactone but stopped taking the lasix when she ran out of pills a couple of weeks ago  Grandson states that she is also more forgetful than usual.   Otherwise, no c/o jaundice, abdominal pain, hematemesis, melena.    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 Hep C genotype is 1a, viral load 551,000 IU/mL, she is treatment naive. Treatment on hold in the case that she might need liver transplant  Patient is not candidate for liver transplant at [facility] and referred to [facility] although she has not scheduled an appointment as of yet      Outpatient Encounter Prescriptions as of 3/24/2017   Medication Sig Dispense Refill    furosemide (LASIX) 80 MG tablet Take 80 mg by mouth 2 (two) times daily.      ibuprofen (ADVIL,MOTRIN) 200 MG tablet Take 200 mg by mouth every 6 (six) hours as needed.      multivitamin (THERAGRAN) per tablet Take 1 tablet by mouth once daily.      omeprazole (PRILOSEC) 20 MG capsule Take 1 capsule (20 mg total) by mouth before breakfast. 90 capsule 3    spironolactone (ALDACTONE) 100 MG tablet Take 100 mg by mouth 2 (two) times daily.      tramadol (ULTRAM) 50 mg tablet Take 1 tablet (50 mg total) by mouth every 6 (six) hours as needed for Pain. 10 tablet 0     No facility-administered encounter medications on file as of 3/24/2017.      Review of patient's allergies indicates:  No Known Allergies  Past Medical History:   Diagnosis Date    Ascites     Cirrhosis     Hepatitis     Hep C deja 1a, treatment naive       Review of Systems   Constitutional: Negative for activity change,  appetite change, chills, diaphoresis, fatigue, fever and unexpected weight change.   HENT: Negative for facial swelling and nosebleeds.    Respiratory: Negative for cough, chest tightness and shortness of breath.    Cardiovascular: Positive for leg swelling. Negative for chest pain and palpitations.   Gastrointestinal: Positive for abdominal distention. Negative for abdominal pain, blood in stool, constipation, diarrhea, nausea and vomiting.   Musculoskeletal: Negative for neck pain and neck stiffness.   Skin: Negative for color change, pallor and rash.   Neurological: Negative for dizziness, tremors, syncope, weakness, light-headedness and headaches.   Hematological: Negative for adenopathy. Does not bruise/bleed easily.   Psychiatric/Behavioral: Negative for agitation, behavioral problems, confusion and decreased concentration.     Vitals:    03/24/17 1320   BP: (!) 161/73   Pulse: 95       Physical Exam   Constitutional: She is oriented to person, place, and time. She appears well-developed and well-nourished. No distress.   HENT:   Head: Normocephalic and atraumatic.   Eyes: Conjunctivae are normal. Pupils are equal, round, and reactive to light. No scleral icterus.   Neck: Normal range of motion. Neck supple.   Cardiovascular: Normal rate.    Pulmonary/Chest: Effort normal.   Abdominal: Soft. She exhibits distension. She exhibits no mass. There is no tenderness. There is no rebound and no guarding.   Musculoskeletal: Normal range of motion.   1+ pitting edema to BLEs   Neurological: She is alert and oriented to person, place, and time. No cranial nerve deficit. She exhibits normal muscle tone. Coordination normal.   No asterixis   Skin: Skin is warm and dry. No rash noted. She is not diaphoretic. No erythema. No pallor.   Psychiatric: She has a normal mood and affect. Her behavior is normal. Judgment and thought content normal.       MELD-Na score: 14 at 2/20/2017 12:11 PM  MELD score: 14 at 2/20/2017 12:11  PM  Calculated from:  Serum Creatinine: 1.3 mg/dL at 2/20/2017 12:11 PM  Serum Sodium: 137 mmol/L at 2/20/2017 12:11 PM  Total Bilirubin: 1.7 mg/dL at 2/20/2017 12:11 PM  INR(ratio): 1.3 at 2/20/2017 12:11 PM  Age: 71 years    Lab Results   Component Value Date    GLU 97 02/20/2017    BUN 21 02/20/2017    CREATININE 1.3 02/20/2017    CALCIUM 8.4 (L) 02/20/2017     02/20/2017    K 3.8 02/20/2017     02/20/2017    PROT 6.0 02/20/2017    CO2 27 02/20/2017    ANIONGAP 6 (L) 02/20/2017    WBC 3.39 (L) 03/10/2017    RBC 3.50 (L) 03/10/2017    HGB 11.3 (L) 03/10/2017    HCT 33.6 (L) 03/10/2017    MCV 96 03/10/2017    MCH 32.3 (H) 03/10/2017    MCHC 33.6 03/10/2017     Lab Results   Component Value Date    RDW 14.3 03/10/2017     (L) 03/10/2017    MPV 10.1 03/10/2017    GRAN 1.6 (L) 03/10/2017    GRAN 47.8 03/10/2017    LYMPH 1.3 03/10/2017    LYMPH 37.5 03/10/2017    MONO 0.4 03/10/2017    MONO 11.5 03/10/2017    EOSINOPHIL 2.9 03/10/2017    BASOPHIL 0.3 03/10/2017    EOS 0.1 03/10/2017    BASO 0.01 03/10/2017    ALBUMIN 2.4 (L) 02/20/2017    AST 45 (H) 02/20/2017    ALT 20 02/20/2017    ALKPHOS 138 (H) 02/20/2017    LABPROT 13.0 (H) 03/10/2017    INR 1.3 (H) 03/10/2017       Assessment and Plan:  Liver cirrhosis 2/2 chronic HCV : MELD of 14(2/20)  -recommend vaccination of hepatitis A and B  -instructed to scheduled appointment w/ [facility], patient provided with phone number  HCC screening: next due August  EVscreening/surveillance: EGD 3/10/14, small varices, mild PHTNG  HE: - Use sliding scale for lactulose as follows:  Take 1 cup 30 mL at 8AM. If no stool by 12 noon, take 30 mL more of lactulose. If <2 stools by 3 PM, take 30 mL more of lactulose. Goal is 3-4 soft stools daily.   Ascites/edema: continue diuretics as directed  -follow low Na+ diet, < 2Gm  -will get therapeutic para scheduled    RTC  12 weeks    Patient Active Problem List   Diagnosis    Cirrhosis    Chronic hepatitis C with  cirrhosis    Hepatic encephalopathy    Ascites     Clarisa Shi is a 71 y.o. female withNo chief complaint on file.

## 2017-03-27 ENCOUNTER — TELEPHONE (OUTPATIENT)
Dept: HEPATOLOGY | Facility: CLINIC | Age: 72
End: 2017-03-27

## 2017-03-27 NOTE — TELEPHONE ENCOUNTER
----- Message from Ivet Carrizales sent at 3/27/2017 12:51 PM CDT -----  Contact: patient   Calling to get the clinicals and diagnosis sent to [facility] with labs or procedure results. Please fax  att: bethel

## 2017-03-29 ENCOUNTER — HOSPITAL ENCOUNTER (OUTPATIENT)
Dept: INTERVENTIONAL RADIOLOGY/VASCULAR | Facility: HOSPITAL | Age: 72
Discharge: HOME | End: 2017-03-29
Attending: NURSE PRACTITIONER
Payer: MEDICARE

## 2017-03-29 VITALS
OXYGEN SATURATION: 100 % | SYSTOLIC BLOOD PRESSURE: 113 MMHG | RESPIRATION RATE: 18 BRPM | DIASTOLIC BLOOD PRESSURE: 58 MMHG | HEART RATE: 92 BPM

## 2017-03-29 DIAGNOSIS — R18.8 OTHER ASCITES: ICD-10-CM

## 2017-03-29 LAB
APPEARANCE FLD: CLEAR
BODY FLD TYPE: NORMAL
COLOR FLD: YELLOW
LYMPHOCYTES NFR FLD MANUAL: 40 %
MESOTHL CELL NFR FLD MANUAL: 1 %
MONOS+MACROS NFR FLD MANUAL: 48 %
NEUTROPHILS NFR FLD MANUAL: 11 %
PATH INTERP FLD-IMP: NORMAL
PROT FLD-MCNC: 1.9 G/DL
SPECIMEN SOURCE: NORMAL
WBC # FLD: 51 /CU MM

## 2017-03-29 PROCEDURE — P9047 ALBUMIN (HUMAN), 25%, 50ML: HCPCS | Performed by: NURSE PRACTITIONER

## 2017-03-29 PROCEDURE — 63600175 PHARM REV CODE 636 W HCPCS: Performed by: NURSE PRACTITIONER

## 2017-03-29 PROCEDURE — C1729 CATH, DRAINAGE: HCPCS

## 2017-03-29 PROCEDURE — 84157 ASSAY OF PROTEIN OTHER: CPT

## 2017-03-29 PROCEDURE — 49083 ABD PARACENTESIS W/IMAGING: CPT | Mod: GC,,, | Performed by: RADIOLOGY

## 2017-03-29 PROCEDURE — 89051 BODY FLUID CELL COUNT: CPT

## 2017-03-29 PROCEDURE — 27100111 IR PARACENTESIS WITH IMAGING

## 2017-03-29 RX ORDER — ALBUMIN HUMAN 250 G/1000ML
12.5 SOLUTION INTRAVENOUS
Status: DISCONTINUED | OUTPATIENT
Start: 2017-03-29 | End: 2017-03-30 | Stop reason: HOSPADM

## 2017-03-29 RX ORDER — ALBUMIN HUMAN 250 G/1000ML
37.5 SOLUTION INTRAVENOUS ONCE
Status: COMPLETED | OUTPATIENT
Start: 2017-03-29 | End: 2017-03-29

## 2017-03-29 RX ORDER — ALBUMIN HUMAN 250 G/1000ML
12.5 SOLUTION INTRAVENOUS ONCE
Status: COMPLETED | OUTPATIENT
Start: 2017-03-29 | End: 2017-03-29

## 2017-03-29 RX ADMIN — ALBUMIN (HUMAN) 12.5 G: 25 SOLUTION INTRAVENOUS at 10:03

## 2017-03-29 RX ADMIN — ALBUMIN (HUMAN) 12.5 G: 25 SOLUTION INTRAVENOUS at 11:03

## 2017-03-29 RX ADMIN — ALBUMIN (HUMAN) 37.5 G: 25 SOLUTION INTRAVENOUS at 09:03

## 2017-03-29 NOTE — PROCEDURES
Radiology Post-Procedure Note    Pre Op Diagnosis: Ascites  Post Op Diagnosis: Same    Procedure: Paracentesis    Procedure performed by: Dr. Chris, Dr. Heath    Written Informed Consent Obtained: Yes  Specimen Removed: NO  Estimated Blood Loss: Minimal    Findings:   Successful paracentesis.  Albumin administered PRN per protocol.    Patient tolerated procedure well.    Levar Chris MD  Department of Radiology  [phone]

## 2017-03-29 NOTE — PROGRESS NOTES
Patient stable, tolerated paracentesis well, 10.7 L removed, labs sent, 300 mL albumin given per protocol.  Discharge instruction, and follow up care reviewed.  Patient verbalized understanding, and denies further questions.  Provided with ROCU and after hours number.

## 2017-03-29 NOTE — H&P
Radiology History & Physical      SUBJECTIVE:     Chief Complaint: ascites    History of Present Illness:  Clarisa Shi is a 71 y.o. female who presents for ultrasound guided paracentesis  Past Medical History:   Diagnosis Date    Ascites     Cirrhosis     Hepatitis     Hep C deja 1a, treatment naive     No past surgical history on file.    Home Meds:   Prior to Admission medications    Medication Sig Start Date End Date Taking? Authorizing Provider   furosemide (LASIX) 80 MG tablet Take 80 mg by mouth 2 (two) times daily. 1/29/17   Historical Provider, MD   ibuprofen (ADVIL,MOTRIN) 200 MG tablet Take 200 mg by mouth every 6 (six) hours as needed.    Historical Provider, MD   lactulose (CHRONULAC) 10 gram/15 mL solution Take 15 mLs (10 g total) by mouth 2 (two) times daily. 3/24/17   Estefania Rodriguez NP   multivitamin (THERAGRAN) per tablet Take 1 tablet by mouth once daily.    Historical Provider, MD   omeprazole (PRILOSEC) 20 MG capsule Take 1 capsule (20 mg total) by mouth before breakfast. 3/10/17 3/10/18  Uriel Haji MD   spironolactone (ALDACTONE) 100 MG tablet Take 100 mg by mouth 2 (two) times daily.    Historical Provider, MD   tramadol (ULTRAM) 50 mg tablet Take 1 tablet (50 mg total) by mouth every 6 (six) hours as needed for Pain. 3/30/13   Damion Cruz MD     Anticoagulants/Antiplatelets: no anticoagulation    Allergies: Review of patient's allergies indicates:  No Known Allergies  Sedation History:  no adverse reactions    Review of Systems:   Hematological: no known coagulopathies  Respiratory: no shortness of breath  Cardiovascular: no chest pain  Gastrointestinal: no abdominal pain  Genito-Urinary: no dysuria  Musculoskeletal: negative  Neurological: no TIA or stroke symptoms         OBJECTIVE:     Vital Signs (Most Recent)       Physical Exam:  ASA: 2  Mallampati: n/a    General: no acute distress  Mental Status: alert and oriented to person, place and time  HEENT:  normocephalic, atraumatic  Chest: unlabored breathing  Heart: regular heart rate  Abdomen: distended  Extremity: moves all extremities    Laboratory  Lab Results   Component Value Date    INR 1.3 (H) 03/10/2017       Lab Results   Component Value Date    WBC 3.39 (L) 03/10/2017    HGB 11.3 (L) 03/10/2017    HCT 33.6 (L) 03/10/2017    MCV 96 03/10/2017     (L) 03/10/2017      Lab Results   Component Value Date    GLU 97 02/20/2017     02/20/2017    K 3.8 02/20/2017     02/20/2017    CO2 27 02/20/2017    BUN 21 02/20/2017    CREATININE 1.3 02/20/2017    CALCIUM 8.4 (L) 02/20/2017    ALT 20 02/20/2017    AST 45 (H) 02/20/2017    ALBUMIN 2.4 (L) 02/20/2017    BILITOT 1.7 (H) 02/20/2017       ASSESSMENT/PLAN:     Sedation Plan: local  Patient will undergo ultrasound guided paracentesis.    TARA Gramajo, FNP  Interventional Radiology  [phone] spectral[employer]

## 2017-03-29 NOTE — IP AVS SNAPSHOT
[facility]  [address]  [facility] [address]  Phone: [phone]           Patient Discharge Instructions   Our goal is to set you up for success. This packet includes information on your condition, medications, and your home care.  It will help you care for yourself to prevent having to return to the hospital.     Please ask your nurse if you have any questions.      There are many details to remember when preparing to leave the hospital. Here is what you will need to do:    1. Take your medicine. If you are prescribed medications, review your Medication List on the following pages. You may have new medications to  at the pharmacy and others that you'll need to stop taking. Review the instructions for how and when to take your medications. Talk with your doctor or nurses if you are unsure of what to do.     2. Go to your follow-up appointments. Specific follow-up information is listed in the following pages. Your may be contacted by a nurse or clinical provider about future appointments. Be sure we have all of the phone numbers to reach you. Please contact your provider's office if you are unable to make an appointment.     3. Watch for warning signs. Your doctor or nurse will give you detailed warning signs to watch for and when to call for assistance. These instructions may also include educational information about your condition. If you experience any of warning signs to your health, call your doctor.               [facility]  Unless otherwise directed by your provider, please contact [facility], our nurse care line that is available for 24/7 assistance.     [phone] (toll-free)    Registered nurses in the [facility] provide clinical advisement, health education, appointment booking, and other advisory services.                    ** Verify the list of medication(s) below is accurate and up to date. Carry this with you in case of  emergency. If your medications have changed, please notify your healthcare provider.             Medication List      TAKE these medications        Additional Info                      furosemide 80 MG tablet   Commonly known as:  LASIX   Refills:  0   Dose:  80 mg    Instructions:  Take 80 mg by mouth 2 (two) times daily.     Begin Date    AM    Noon    PM    Bedtime       ibuprofen 200 MG tablet   Commonly known as:  ADVIL,MOTRIN   Refills:  0   Dose:  200 mg    Instructions:  Take 200 mg by mouth every 6 (six) hours as needed.     Begin Date    AM    Noon    PM    Bedtime       lactulose 10 gram/15 mL solution   Commonly known as:  CHRONULAC   Quantity:  946 mL   Refills:  5   Dose:  10 g    Instructions:  Take 15 mLs (10 g total) by mouth 2 (two) times daily.     Begin Date    AM    Noon    PM    Bedtime       multivitamin per tablet   Commonly known as:  THERAGRAN   Refills:  0   Dose:  1 tablet    Instructions:  Take 1 tablet by mouth once daily.     Begin Date    AM    Noon    PM    Bedtime       omeprazole 20 MG capsule   Commonly known as:  PRILOSEC   Quantity:  90 capsule   Refills:  3   Dose:  20 mg    Instructions:  Take 1 capsule (20 mg total) by mouth before breakfast.     Begin Date    AM    Noon    PM    Bedtime       spironolactone 100 MG tablet   Commonly known as:  ALDACTONE   Refills:  0   Dose:  100 mg    Instructions:  Take 100 mg by mouth 2 (two) times daily.     Begin Date    AM    Noon    PM    Bedtime       tramadol 50 mg tablet   Commonly known as:  ULTRAM   Quantity:  10 tablet   Refills:  0   Dose:  50 mg    Instructions:  Take 1 tablet (50 mg total) by mouth every 6 (six) hours as needed for Pain.     Begin Date    AM    Noon    PM    Bedtime                  Please bring to all follow up appointments:    1. A copy of your discharge instructions.  2. All medicines you are currently taking in their original bottles.  3. Identification and insurance card.    Please arrive 15 minutes ahead  of scheduled appointment time.    Please call 24 hours in advance if you must reschedule your appointment and/or time.            Discharge Instructions       ROC[facility] [phone] M-F 8:00 - 5:00  After hours [phone] (ask for radiology intern on call)      Discharge References/Attachments     ASCITES (ENGLISH)        Admission Information     Date & Time Provider Department CSN    3/29/2017  8:30 AM Estefania Rodriguez NP [facility] [id number]      Care Providers     Provider Role Specialty Primary office phone    Estefania Rodriguez NP Attending Provider Transplant [phone]      Your Vitals Were     BP Pulse SpO2             142/70 92 100%         Recent Lab Values     No lab values to display.      Allergies as of 3/29/2017     No Known Allergies      Advance Directives     An advance directive is a document which, in the event you are no longer able to make decisions for yourself, tells your healthcare team what kind of treatment you do or do not want to receive, or who you would like to make those decisions for you.  If you do not currently have an advance directive, [facility] encourages you to create one.  For more information call:  [phone]), [phone] ([phone]),  or log on to [email or web address].        Smoking Cessation     If you would like to quit smoking:   You may be eligible for free services if you are a [place] resident and started smoking cigarettes before September 1, 1988.  Call the Smoking Cessation [employer] ([facility]) toll free at (943) 552-8500 or (568) 840-9924.   Call 1-800-QUIT-NOW if you do not meet the above criteria.            Language Assistance Services     ATTENTION: Language assistance services are available, free of charge. Please call [phone].      ATENCIÓN: Si habla español, tiene a ahumada disposición servicios gratuitos de asistencia lingüística. Llame al [phone].     CHÚ Ý: N?u b?n nói Ti?ng Vi?t, có các d?ch v? h? tr? ngôn ng?  mi?n phí zeynep cho b?n. G?i s? [phone].        [facility] Sign-Up     Activating your [facility] account is as easy as 1-2-3!     1) Visit [email or web address], select Sign Up Now, enter this activation code and your date of birth, then select Next.  [email or web address]  Expires: 4/24/2017  6:19 PM      2) Create a username and password to use when you visit [facility] in the future and select a security question in case you lose your password and select Next.    3) Enter your e-mail address and click Sign Up!    Additional Information  If you have questions, please e-mail [email or web address] or call [phone] to talk to our [facility] staff. Remember, [facility] is NOT to be used for urgent needs. For medical emergencies, dial 911.          [facility] complies with applicable Federal civil rights laws and does not discriminate on the basis of race, color, national origin, age, disability, or sex.

## 2024-06-09 NOTE — DISCHARGE INSTRUCTIONS
For scheduling: Call Gayatri at [phone]    For questions or concerns call: THANIA MON-FRI 8 AM- 5PM [phone]. Radiology resident on call [phone].    For immediate concerns that are not emergent, you may call our radiology clinic at: [phone]  
calm